# Patient Record
Sex: MALE | Race: WHITE | NOT HISPANIC OR LATINO | Employment: FULL TIME | URBAN - METROPOLITAN AREA
[De-identification: names, ages, dates, MRNs, and addresses within clinical notes are randomized per-mention and may not be internally consistent; named-entity substitution may affect disease eponyms.]

---

## 2023-05-11 ENCOUNTER — OFFICE VISIT (OUTPATIENT)
Dept: PRIMARY CARE | Facility: CLINIC | Age: 78
End: 2023-05-11
Payer: MEDICARE

## 2023-05-11 VITALS
HEART RATE: 71 BPM | BODY MASS INDEX: 26.04 KG/M2 | HEIGHT: 71 IN | OXYGEN SATURATION: 98 % | SYSTOLIC BLOOD PRESSURE: 144 MMHG | TEMPERATURE: 97.7 F | WEIGHT: 186 LBS | DIASTOLIC BLOOD PRESSURE: 84 MMHG | RESPIRATION RATE: 14 BRPM

## 2023-05-11 DIAGNOSIS — Z86.79 HISTORY OF VARICOSE VEINS: ICD-10-CM

## 2023-05-11 DIAGNOSIS — I70.213 ATHEROSCLER OF NATIVE ARTERY OF BOTH LEGS WITH INTERMIT CLAUDICATION (CMS-HCC): ICD-10-CM

## 2023-05-11 DIAGNOSIS — I10 PRIMARY HYPERTENSION: Primary | ICD-10-CM

## 2023-05-11 DIAGNOSIS — D69.6 THROMBOCYTOPENIA (CMS-HCC): ICD-10-CM

## 2023-05-11 PROBLEM — G47.33 OBSTRUCTIVE SLEEP APNEA: Status: ACTIVE | Noted: 2023-05-11

## 2023-05-11 PROBLEM — G47.30 SLEEP DISORDER BREATHING: Status: ACTIVE | Noted: 2023-05-11

## 2023-05-11 PROBLEM — I87.2 VENOUS INSUFFICIENCY: Status: ACTIVE | Noted: 2023-05-11

## 2023-05-11 PROBLEM — R94.31 ABNORMAL ECG: Status: ACTIVE | Noted: 2023-05-11

## 2023-05-11 PROBLEM — J32.9 SINUSITIS: Status: ACTIVE | Noted: 2023-05-11

## 2023-05-11 PROBLEM — R60.0 BILATERAL LOWER EXTREMITY EDEMA: Status: ACTIVE | Noted: 2023-05-11

## 2023-05-11 PROBLEM — M54.2 NECK PAIN: Status: ACTIVE | Noted: 2023-05-11

## 2023-05-11 PROBLEM — M79.642 PAIN OF LEFT HAND: Status: ACTIVE | Noted: 2023-05-11

## 2023-05-11 PROBLEM — N63.10 MASS OF BREAST, RIGHT: Status: ACTIVE | Noted: 2023-05-11

## 2023-05-11 PROCEDURE — 1157F ADVNC CARE PLAN IN RCRD: CPT | Performed by: INTERNAL MEDICINE

## 2023-05-11 PROCEDURE — 1036F TOBACCO NON-USER: CPT | Performed by: INTERNAL MEDICINE

## 2023-05-11 PROCEDURE — 1159F MED LIST DOCD IN RCRD: CPT | Performed by: INTERNAL MEDICINE

## 2023-05-11 PROCEDURE — 3077F SYST BP >= 140 MM HG: CPT | Performed by: INTERNAL MEDICINE

## 2023-05-11 PROCEDURE — 1160F RVW MEDS BY RX/DR IN RCRD: CPT | Performed by: INTERNAL MEDICINE

## 2023-05-11 PROCEDURE — 99213 OFFICE O/P EST LOW 20 MIN: CPT | Performed by: INTERNAL MEDICINE

## 2023-05-11 PROCEDURE — 3079F DIAST BP 80-89 MM HG: CPT | Performed by: INTERNAL MEDICINE

## 2023-05-11 RX ORDER — SPIRONOLACTONE 25 MG/1
TABLET ORAL
COMMUNITY
End: 2023-05-11 | Stop reason: ALTCHOICE

## 2023-05-11 RX ORDER — TRAZODONE HYDROCHLORIDE 50 MG/1
1 TABLET ORAL NIGHTLY PRN
COMMUNITY
Start: 2022-11-16 | End: 2023-12-19 | Stop reason: ENTERED-IN-ERROR

## 2023-05-11 RX ORDER — AMLODIPINE BESYLATE 5 MG/1
2.5 TABLET ORAL DAILY
COMMUNITY
End: 2023-05-11 | Stop reason: SDUPTHER

## 2023-05-11 RX ORDER — LOSARTAN POTASSIUM AND HYDROCHLOROTHIAZIDE 12.5; 5 MG/1; MG/1
1 TABLET ORAL DAILY
Qty: 30 TABLET | Refills: 11 | Status: SHIPPED | OUTPATIENT
Start: 2023-05-11 | End: 2023-06-06

## 2023-05-11 RX ORDER — FLUTICASONE PROPIONATE 50 MCG
SPRAY, SUSPENSION (ML) NASAL
COMMUNITY
Start: 2023-01-17 | End: 2023-05-11 | Stop reason: ALTCHOICE

## 2023-05-11 RX ORDER — AMLODIPINE BESYLATE 2.5 MG/1
2.5 TABLET ORAL DAILY
Qty: 90 TABLET | Refills: 3 | Status: SHIPPED | OUTPATIENT
Start: 2023-05-11 | End: 2023-08-16 | Stop reason: SINTOL

## 2023-05-11 RX ORDER — OMEPRAZOLE 10 MG/1
10 CAPSULE, DELAYED RELEASE ORAL DAILY
COMMUNITY

## 2023-05-11 RX ORDER — FUROSEMIDE 20 MG/1
1 TABLET ORAL DAILY PRN
COMMUNITY
Start: 2023-03-13 | End: 2023-05-11 | Stop reason: ALTCHOICE

## 2023-05-11 RX ORDER — CARVEDILOL 12.5 MG/1
TABLET ORAL
COMMUNITY
End: 2024-04-19

## 2023-05-11 RX ORDER — HYDROCHLOROTHIAZIDE 12.5 MG/1
12.5 TABLET ORAL
COMMUNITY
End: 2023-05-11

## 2023-05-11 ASSESSMENT — ENCOUNTER SYMPTOMS
SHORTNESS OF BREATH: 0
WHEEZING: 0
CONSTIPATION: 0
PALPITATIONS: 0
HYPERTENSION: 1
ABDOMINAL PAIN: 0
COUGH: 0
DIARRHEA: 1

## 2023-05-11 NOTE — PROGRESS NOTES
"Subjective   Patient ID: Mateo Berumen is a 77 y.o. male who presents for Hypertension (Follow up htn.).    Hypertension  Pertinent negatives include no chest pain, palpitations or shortness of breath.        Review of Systems   Respiratory:  Negative for cough, shortness of breath and wheezing.    Cardiovascular:  Negative for chest pain and palpitations.   Gastrointestinal:  Positive for diarrhea. Negative for abdominal pain and constipation.       Objective   /84 (BP Location: Right arm, Patient Position: Sitting, BP Cuff Size: Adult)   Pulse 71   Temp 36.5 °C (97.7 °F) (Tympanic)   Resp 14   Ht 1.803 m (5' 11\")   Wt 84.4 kg (186 lb)   SpO2 98%   BMI 25.94 kg/m²     Physical Exam  Vitals reviewed.   Constitutional:       Appearance: Normal appearance.   HENT:      Head: Normocephalic.   Cardiovascular:      Rate and Rhythm: Normal rate.   Pulmonary:      Effort: Pulmonary effort is normal.   Musculoskeletal:         General: Normal range of motion.   Neurological:      General: No focal deficit present.      Mental Status: He is alert.   Psychiatric:         Mood and Affect: Mood normal.         Assessment/Plan   Problem List Items Addressed This Visit          Circulatory    Atheroscler of native artery of both legs with intermit claudication (CMS/HCC)    Hypertension - Primary    Relevant Medications    amLODIPine (Norvasc) 2.5 mg tablet    losartan-hydrochlorothiazide (Hyzaar) 50-12.5 mg tablet    Other Relevant Orders    Basic Metabolic Panel       Hematologic    Thrombocytopenia (CMS/HCC)     Other Visit Diagnoses       History of varicose veins            Discussed patient's current blood pressure and discussed goal blood pressure of 120/80.  We discussed the benefit of low salt diet and regular physical activity of at least 150 min/week.  We discussed checking their blood pressure outside of the office 2-3 x/week.  Patient is to document their results and notify the office if blood " pressure results are regularly over 130/85.  All questions answered.

## 2023-06-06 DIAGNOSIS — I10 PRIMARY HYPERTENSION: ICD-10-CM

## 2023-06-06 RX ORDER — LOSARTAN POTASSIUM AND HYDROCHLOROTHIAZIDE 12.5; 5 MG/1; MG/1
TABLET ORAL
Qty: 30 TABLET | Refills: 11 | Status: SHIPPED | OUTPATIENT
Start: 2023-06-06 | End: 2023-08-16 | Stop reason: ALTCHOICE

## 2023-08-15 ENCOUNTER — LAB (OUTPATIENT)
Dept: LAB | Facility: LAB | Age: 78
End: 2023-08-15
Payer: MEDICARE

## 2023-08-15 DIAGNOSIS — I10 PRIMARY HYPERTENSION: ICD-10-CM

## 2023-08-15 LAB
ANION GAP IN SER/PLAS: 13 MMOL/L (ref 10–20)
CALCIUM (MG/DL) IN SER/PLAS: 10 MG/DL (ref 8.6–10.3)
CARBON DIOXIDE, TOTAL (MMOL/L) IN SER/PLAS: 28 MMOL/L (ref 21–32)
CHLORIDE (MMOL/L) IN SER/PLAS: 103 MMOL/L (ref 98–107)
CREATININE (MG/DL) IN SER/PLAS: 0.97 MG/DL (ref 0.5–1.3)
GFR MALE: 81 ML/MIN/1.73M2
GLUCOSE (MG/DL) IN SER/PLAS: 129 MG/DL (ref 74–99)
POTASSIUM (MMOL/L) IN SER/PLAS: 4.1 MMOL/L (ref 3.5–5.3)
SODIUM (MMOL/L) IN SER/PLAS: 140 MMOL/L (ref 136–145)
UREA NITROGEN (MG/DL) IN SER/PLAS: 17 MG/DL (ref 6–23)

## 2023-08-15 PROCEDURE — 80048 BASIC METABOLIC PNL TOTAL CA: CPT

## 2023-08-15 PROCEDURE — 36415 COLL VENOUS BLD VENIPUNCTURE: CPT

## 2023-08-16 ENCOUNTER — OFFICE VISIT (OUTPATIENT)
Dept: PRIMARY CARE | Facility: CLINIC | Age: 78
End: 2023-08-16
Payer: MEDICARE

## 2023-08-16 VITALS
HEART RATE: 70 BPM | OXYGEN SATURATION: 98 % | HEIGHT: 71 IN | RESPIRATION RATE: 14 BRPM | BODY MASS INDEX: 25.62 KG/M2 | SYSTOLIC BLOOD PRESSURE: 128 MMHG | WEIGHT: 183 LBS | DIASTOLIC BLOOD PRESSURE: 80 MMHG | TEMPERATURE: 97 F

## 2023-08-16 DIAGNOSIS — I10 PRIMARY HYPERTENSION: Primary | ICD-10-CM

## 2023-08-16 DIAGNOSIS — K76.0 FATTY LIVER: ICD-10-CM

## 2023-08-16 DIAGNOSIS — Z00.00 PERIODIC HEALTH ASSESSMENT, GENERAL SCREENING, ADULT: ICD-10-CM

## 2023-08-16 DIAGNOSIS — R53.83 FATIGUE, UNSPECIFIED TYPE: ICD-10-CM

## 2023-08-16 DIAGNOSIS — R60.9 EDEMA, UNSPECIFIED TYPE: ICD-10-CM

## 2023-08-16 DIAGNOSIS — R73.9 ELEVATED BLOOD SUGAR: ICD-10-CM

## 2023-08-16 LAB — POC HEMOGLOBIN A1C: 5.6 % (ref 4.2–6.5)

## 2023-08-16 PROCEDURE — 1126F AMNT PAIN NOTED NONE PRSNT: CPT | Performed by: INTERNAL MEDICINE

## 2023-08-16 PROCEDURE — 3079F DIAST BP 80-89 MM HG: CPT | Performed by: INTERNAL MEDICINE

## 2023-08-16 PROCEDURE — 1160F RVW MEDS BY RX/DR IN RCRD: CPT | Performed by: INTERNAL MEDICINE

## 2023-08-16 PROCEDURE — 99214 OFFICE O/P EST MOD 30 MIN: CPT | Performed by: INTERNAL MEDICINE

## 2023-08-16 PROCEDURE — 3074F SYST BP LT 130 MM HG: CPT | Performed by: INTERNAL MEDICINE

## 2023-08-16 PROCEDURE — 1157F ADVNC CARE PLAN IN RCRD: CPT | Performed by: INTERNAL MEDICINE

## 2023-08-16 PROCEDURE — 1036F TOBACCO NON-USER: CPT | Performed by: INTERNAL MEDICINE

## 2023-08-16 PROCEDURE — 83036 HEMOGLOBIN GLYCOSYLATED A1C: CPT | Performed by: INTERNAL MEDICINE

## 2023-08-16 PROCEDURE — 1159F MED LIST DOCD IN RCRD: CPT | Performed by: INTERNAL MEDICINE

## 2023-08-16 RX ORDER — LOSARTAN POTASSIUM AND HYDROCHLOROTHIAZIDE 12.5; 1 MG/1; MG/1
1 TABLET ORAL DAILY
Qty: 30 TABLET | Refills: 11 | Status: SHIPPED | OUTPATIENT
Start: 2023-08-16 | End: 2023-09-13

## 2023-08-16 ASSESSMENT — ENCOUNTER SYMPTOMS
HYPERTENSION: 1
DIARRHEA: 0
CONSTIPATION: 0
SHORTNESS OF BREATH: 0
WHEEZING: 0
COUGH: 0
ABDOMINAL PAIN: 0

## 2023-08-16 NOTE — PROGRESS NOTES
"Subjective   Patient ID: Mateo Berumen is a 77 y.o. male who presents for Hypertension ( And lab work results.).    Hypertension  Pertinent negatives include no chest pain or shortness of breath.    We reviewed and discussed his blood tests.  He is active and denies any issues with CP, SOB or dizzy spells.    Still with swelling in legs.  No pain.  No claudication.      Review of Systems   Respiratory:  Negative for cough, shortness of breath and wheezing.    Cardiovascular:  Negative for chest pain.   Gastrointestinal:  Negative for abdominal pain, constipation and diarrhea.       Objective   /80 (BP Location: Left arm, Patient Position: Sitting, BP Cuff Size: Adult)   Pulse 70   Temp 36.1 °C (97 °F) (Tympanic)   Resp 14   Ht 1.803 m (5' 11\")   Wt 83 kg (183 lb)   SpO2 98%   BMI 25.52 kg/m²     Physical Exam  Vitals reviewed.   Constitutional:       Appearance: Normal appearance.   HENT:      Head: Normocephalic.   Cardiovascular:      Rate and Rhythm: Normal rate.   Pulmonary:      Effort: Pulmonary effort is normal.   Musculoskeletal:         General: Normal range of motion.   Neurological:      General: No focal deficit present.      Mental Status: He is alert.   Psychiatric:         Mood and Affect: Mood normal.         Assessment/Plan   Problem List Items Addressed This Visit       Hypertension - Primary    Relevant Medications    losartan-hydrochlorothiazide (Hyzaar) 100-12.5 mg tablet     Other Visit Diagnoses       Elevated blood sugar        Relevant Orders    Hemoglobin A1C    POCT glycosylated hemoglobin (Hb A1C) manually resulted (Completed)    Fatty liver        Relevant Orders    Comprehensive Metabolic Panel    Periodic health assessment, general screening, adult        Relevant Orders    CBC    Comprehensive Metabolic Panel    Lipid Panel    Prostate Specific Antigen    Thyroid Stimulating Hormone    Hemoglobin A1C    Fatigue, unspecified type        Relevant Orders    CBC    Edema, " unspecified type            Still with pitting edema of lower extremities - equal bilateral.  Seems to have started only since being on amlodipine.  We will stop this and double his losartan.    We discussed his elevated blood sugar. A1c is ok.  We discussed the benefits of low sugar and low carbohydrate diet (avoiding sugars, sweets, desserts, pop, juice, milk and minimizing foods such as breads, pasta/noodles, rice, cereal etc)   We discussed his history of FLD.  Stressed need to keep weight down, sugar down and alcohol to minimum - no more than 2 drinks/day.    Follow up in 6 months - sooner if any issues.

## 2023-08-18 ENCOUNTER — APPOINTMENT (OUTPATIENT)
Dept: PRIMARY CARE | Facility: CLINIC | Age: 78
End: 2023-08-18
Payer: COMMERCIAL

## 2023-09-12 DIAGNOSIS — I10 PRIMARY HYPERTENSION: ICD-10-CM

## 2023-09-13 RX ORDER — LOSARTAN POTASSIUM AND HYDROCHLOROTHIAZIDE 12.5; 1 MG/1; MG/1
1 TABLET ORAL DAILY
Qty: 30 TABLET | Refills: 11 | Status: SHIPPED | OUTPATIENT
Start: 2023-09-13

## 2023-10-20 PROBLEM — Z98.49 HISTORY OF YAG LASER CAPSULOTOMY OF LENS: Status: ACTIVE | Noted: 2017-06-08

## 2023-10-20 PROBLEM — M10.00 IDIOPATHIC GOUT, UNSPECIFIED SITE: Status: ACTIVE | Noted: 2023-10-20

## 2023-10-20 PROBLEM — K76.0 FATTY LIVER: Status: ACTIVE | Noted: 2023-10-20

## 2023-10-20 PROBLEM — I70.219 EXTREMITY ATHEROSCLEROSIS WITH INTERMITTENT CLAUDICATION (CMS-HCC): Status: ACTIVE | Noted: 2023-10-20

## 2023-10-20 PROBLEM — M72.0 DUPUYTREN'S CONTRACTURE OF LEFT HAND: Status: ACTIVE | Noted: 2018-06-21

## 2023-10-20 PROBLEM — K76.9 LIVER LESION: Status: ACTIVE | Noted: 2023-10-20

## 2023-10-20 PROBLEM — G47.20 DISTURBED SLEEP RHYTHM: Status: ACTIVE | Noted: 2023-10-20

## 2023-10-20 PROBLEM — E78.5 DYSLIPIDEMIA: Status: ACTIVE | Noted: 2023-10-20

## 2023-10-20 RX ORDER — AMLODIPINE BESYLATE 5 MG/1
5 TABLET ORAL DAILY
COMMUNITY
End: 2023-10-31 | Stop reason: WASHOUT

## 2023-10-20 RX ORDER — FINASTERIDE 5 MG/1
5 TABLET, FILM COATED ORAL DAILY
COMMUNITY
Start: 2023-09-07 | End: 2024-04-29

## 2023-10-20 RX ORDER — FUROSEMIDE 20 MG/1
20 TABLET ORAL DAILY PRN
COMMUNITY
End: 2024-05-07 | Stop reason: WASHOUT

## 2023-10-20 RX ORDER — ATORVASTATIN CALCIUM 40 MG/1
40 TABLET, FILM COATED ORAL DAILY
COMMUNITY

## 2023-10-20 RX ORDER — ALLOPURINOL 300 MG/1
300 TABLET ORAL DAILY
COMMUNITY
End: 2024-01-23

## 2023-10-23 ENCOUNTER — APPOINTMENT (OUTPATIENT)
Dept: CARDIOLOGY | Facility: CLINIC | Age: 78
End: 2023-10-23
Payer: MEDICARE

## 2023-10-23 NOTE — PROGRESS NOTES
Chief Complaint:   No chief complaint on file.     History Of Present Illness:    Mateo Berumen is a 77 y.o. male with a history of AAA, hypertension, dyslipidemia, abnormal ECG (low voltage and inferior Q waves with poor R wave progression in the precordium), palpitations and atypical chest pain being evaluated for routine follow-up.      Blood pressure was mildly elevated at his primary care physician's office and his amlodipine was increased to 10 mg daily. Shortly thereafter he started noticing leg swelling. He was given a prescription of furosemide to use as needed. This did help and his legs are more similar to how they had been in the past but still swell by the end of the day.     Had a sleep study which demonstrated moderate sleep apnea. Has not had follow-up since that time.     Abdominal aortic duplex 9/15/23: No change in mid AAA 3.57 x 3.48 cm    Echocardiogram 3/29/23:  EF 60-65%. Mild MR and trace TR. Trivial pericardial effusion.     Sleep study March 2023 demonstrated moderate sleep apnea.     Abdominal aortic ultrasound September 2022: Aorta measuring 3.57 cm.     CT of the abdomen April 2021 demonstrating saccular aneurysm of the infrarenal aorta measuring 3.5 cm.     Treadmill nuclear stress test 8/24/18 demonstrating no ECG evidence of ischemia. No nuclear evidence of ischemia or scar. EF 73%. Low likelihood of flow-limiting coronary artery disease.       Past Medical History:  He has a past medical history of Abdominal aortic aneurysm without rupture (CMS/HCC) (09/29/2022), Essential (primary) hypertension (11/16/2022), Personal history of other diseases of male genital organs (05/12/2020), Personal history of other diseases of the musculoskeletal system and connective tissue, Personal history of other diseases of the nervous system and sense organs, Personal history of other diseases of the nervous system and sense organs, Personal history of other endocrine, nutritional and metabolic  disease, and Venous insufficiency (chronic) (peripheral).    Past Surgical History:  He has a past surgical history that includes Other surgical history (02/03/2016).      Social History:  He reports that he has never smoked. He has never used smokeless tobacco. He reports that he does not currently use alcohol after a past usage of about 14.0 standard drinks of alcohol per week. He reports current drug use. Drug: Marijuana.    Family History:  Family History   Problem Relation Name Age of Onset    Cancer Mother      Cancer Father      Heart attack Father      No Known Problems Sister      No Known Problems Brother          Allergies:  Patient has no known allergies.    Outpatient Medications:  Current Outpatient Medications   Medication Instructions    allopurinol (ZYLOPRIM) 300 mg, oral, Daily    amLODIPine (NORVASC) 5 mg, oral, Daily    atorvastatin (LIPITOR) 40 mg, oral, Daily    carvedilol (Coreg) 12.5 mg tablet oral, 2 times daily with meals    finasteride (PROSCAR) 5 mg, oral, Daily    furosemide (LASIX) 20 mg, oral, Daily PRN    losartan-hydrochlorothiazide (Hyzaar) 100-12.5 mg tablet 1 tablet, oral, Daily    OMEPRAZOLE ORAL oral, Daily RT    traZODone (Desyrel) 50 mg tablet 1 tablet, oral, Nightly PRN       Last Recorded Vitals:  Visit Vitals  Smoking Status Never        Physical Exam:  Constitutional: alert and in no acute distress.   HEENT: Mucous membranes moist, carotid upstrokes normal with no bruits. JVP is normal. No cervical lymphadenopathy. Cranial nerves II-XII grossly intact.  Pulmonary: Clear to auscultation bilaterally.  Cardiovascular: S1,S2, regular. No appreciable murmurs, rubs or gallops.   Lower extremities: Warm. 1+ distal pulses. Trivial bipedal edema.  Skin: warm and dry. No obvious rashes visualized.  Psychiatric: Oriented to person, place and time. No obvious agitation.      Last Labs:  CBC -  Lab Results   Component Value Date    WBC 7.9 01/11/2023    HGB 15.3 01/11/2023    HCT 44.6  01/11/2023     01/11/2023     (L) 01/11/2023       CMP -  Lab Results   Component Value Date    CALCIUM 10.0 08/15/2023    PROT 7.2 01/11/2023    ALBUMIN 4.0 01/11/2023    AST 26 01/11/2023    ALT 33 01/11/2023    ALKPHOS 63 01/11/2023    BILITOT 0.7 01/11/2023       LIPID PANEL -   Lab Results   Component Value Date    CHOL 152 01/11/2023    TRIG 129 01/11/2023    HDL 69.6 01/11/2023    CHHDL 2.2 01/11/2023    LDLF 57 01/11/2023    VLDL 26 01/11/2023    NHDL 70 11/12/2020       RENAL FUNCTION PANEL -   Lab Results   Component Value Date    GLUCOSE 129 (H) 08/15/2023     08/15/2023    K 4.1 08/15/2023     08/15/2023    CO2 28 08/15/2023    ANIONGAP 13 08/15/2023    BUN 17 08/15/2023    CREATININE 0.97 08/15/2023    GFRMALE 81 08/15/2023    CALCIUM 10.0 08/15/2023    ALBUMIN 4.0 01/11/2023        Lab Results   Component Value Date    HGBA1C 5.6 08/16/2023           Assessment/Plan   1) dyslipidemia: Continue current regimen. LDL < 70 on last check.     2) hypertension: Although his blood pressures were higher at his primary care physician's office and the amlodipine was increased he did notice leg swelling ever since then. I asked him to decrease the amlodipine to 5 mg daily and call us in a week or 2 with readings. If blood pressure is elevated we will increase his carvedilol.     3) abdominal aortic aneurysm: Following with vascular surgery. Last ultrasound shows diameter measures 3.6 cm compared to 3.7cm 1 year earlier. Continue medical therapy.      4) abnormal ECG: Unchanged.     5) sleep apnea: Continue with sleep medicine     6) lower extremity edema: Likely secondary to increased amlodipine. Echocardiogram pending to rule out structural heart disease. Can use furosemide as needed.     7) follow-up: 6 months or sooner if needed.       Cruzito Mcneil MD

## 2023-10-27 ENCOUNTER — CLINICAL SUPPORT (OUTPATIENT)
Dept: PRIMARY CARE | Facility: CLINIC | Age: 78
End: 2023-10-27
Payer: MEDICARE

## 2023-10-27 PROCEDURE — G0008 ADMIN INFLUENZA VIRUS VAC: HCPCS | Performed by: INTERNAL MEDICINE

## 2023-10-27 PROCEDURE — 90662 IIV NO PRSV INCREASED AG IM: CPT | Performed by: INTERNAL MEDICINE

## 2023-10-31 ENCOUNTER — OFFICE VISIT (OUTPATIENT)
Dept: CARDIOLOGY | Facility: CLINIC | Age: 78
End: 2023-10-31
Payer: MEDICARE

## 2023-10-31 VITALS
HEART RATE: 75 BPM | WEIGHT: 182 LBS | DIASTOLIC BLOOD PRESSURE: 80 MMHG | BODY MASS INDEX: 25.48 KG/M2 | HEIGHT: 71 IN | SYSTOLIC BLOOD PRESSURE: 120 MMHG | OXYGEN SATURATION: 98 %

## 2023-10-31 DIAGNOSIS — I71.40 ABDOMINAL AORTIC ANEURYSM (AAA) WITHOUT RUPTURE, UNSPECIFIED PART (CMS-HCC): ICD-10-CM

## 2023-10-31 DIAGNOSIS — I10 PRIMARY HYPERTENSION: Primary | ICD-10-CM

## 2023-10-31 DIAGNOSIS — E78.5 DYSLIPIDEMIA: ICD-10-CM

## 2023-10-31 DIAGNOSIS — I87.2 VENOUS INSUFFICIENCY: ICD-10-CM

## 2023-10-31 PROCEDURE — 1036F TOBACCO NON-USER: CPT | Performed by: INTERNAL MEDICINE

## 2023-10-31 PROCEDURE — 99214 OFFICE O/P EST MOD 30 MIN: CPT | Performed by: INTERNAL MEDICINE

## 2023-10-31 PROCEDURE — 1159F MED LIST DOCD IN RCRD: CPT | Performed by: INTERNAL MEDICINE

## 2023-10-31 PROCEDURE — 93000 ELECTROCARDIOGRAM COMPLETE: CPT | Performed by: INTERNAL MEDICINE

## 2023-10-31 PROCEDURE — 1126F AMNT PAIN NOTED NONE PRSNT: CPT | Performed by: INTERNAL MEDICINE

## 2023-10-31 PROCEDURE — 3079F DIAST BP 80-89 MM HG: CPT | Performed by: INTERNAL MEDICINE

## 2023-10-31 PROCEDURE — 3074F SYST BP LT 130 MM HG: CPT | Performed by: INTERNAL MEDICINE

## 2023-10-31 PROCEDURE — 1160F RVW MEDS BY RX/DR IN RCRD: CPT | Performed by: INTERNAL MEDICINE

## 2023-10-31 NOTE — PROGRESS NOTES
Chief Complaint:   No chief complaint on file.     History Of Present Illness:    Mateo Berumen is a 77 y.o. male with a history of AAA, hypertension, dyslipidemia, abnormal ECG (anterior and inferior Q waves with low voltage in the precordial leads), palpitations and atypical chest pain being evaluated for routine follow-up.      Checks his blood pressure at home and has been getting occasional elevated readings.  Routinely checks it at the drugstore near work and it is always normal there.  Had been on amlodipine which was decreased initially from 10 mg to 5 mg because of leg swelling.  Amlodipine was eventually discontinued altogether.    Denies any exertional chest pain or shortness of breath.  Denies any significant leg swelling.     Had a sleep study which demonstrated mild to moderate sleep apnea.  He spoke to the specialist and they have decided to hold off on CPAP for now.     Abdominal aortic duplex 9/15/23: No change in mid AAA 3.57 x 3.48 cm    Echocardiogram 3/29/23:  EF 60-65%. Mild MR and trace TR. Trivial pericardial effusion.     Sleep study March 2023 demonstrated moderate sleep apnea.     Abdominal aortic ultrasound September 2022: Aorta measuring 3.57 cm.     CT of the abdomen April 2021 demonstrating saccular aneurysm of the infrarenal aorta measuring 3.5 cm.     Treadmill nuclear stress test 8/24/18 demonstrating no ECG evidence of ischemia. No nuclear evidence of ischemia or scar. EF 73%. Low likelihood of flow-limiting coronary artery disease.     Past Medical History:  He has a past medical history of Abdominal aortic aneurysm without rupture (CMS/HCC) (09/29/2022), Essential (primary) hypertension (11/16/2022), Personal history of other diseases of male genital organs (05/12/2020), Personal history of other diseases of the musculoskeletal system and connective tissue, Personal history of other diseases of the nervous system and sense organs, Personal history of other diseases of the  "nervous system and sense organs, Personal history of other endocrine, nutritional and metabolic disease, and Venous insufficiency (chronic) (peripheral).    Past Surgical History:  He has a past surgical history that includes Other surgical history (02/03/2016).      Social History:  He reports that he has never smoked. He has never used smokeless tobacco. He reports that he does not currently use alcohol after a past usage of about 14.0 standard drinks of alcohol per week. He reports current drug use. Drug: Marijuana.    Family History:  Family History   Problem Relation Name Age of Onset    Cancer Mother      Cancer Father      Heart attack Father      No Known Problems Sister      No Known Problems Brother          Allergies:  Patient has no known allergies.    Outpatient Medications:  Current Outpatient Medications   Medication Instructions    allopurinol (ZYLOPRIM) 300 mg, oral, Daily    atorvastatin (LIPITOR) 40 mg, oral, Daily    carvedilol (Coreg) 12.5 mg tablet oral, 2 times daily with meals    finasteride (PROSCAR) 5 mg, oral, Daily    furosemide (LASIX) 20 mg, oral, Daily PRN    losartan-hydrochlorothiazide (Hyzaar) 100-12.5 mg tablet 1 tablet, oral, Daily    OMEPRAZOLE ORAL oral, Daily RT    traZODone (Desyrel) 50 mg tablet 1 tablet, oral, Nightly PRN       Last Recorded Vitals:  Visit Vitals  /80 (BP Location: Left arm, Patient Position: Sitting)   Pulse 75   Ht 1.803 m (5' 11\")   Wt 82.6 kg (182 lb)   SpO2 98%   BMI 25.38 kg/m²   Smoking Status Never   BSA 2.03 m²      LASTWT(3):   Wt Readings from Last 3 Encounters:   10/31/23 82.6 kg (182 lb)   08/16/23 83 kg (183 lb)   05/11/23 84.4 kg (186 lb)       Physical Exam:  Constitutional: alert and in no acute distress.   HEENT: Mucous membranes moist, carotid upstrokes normal with no bruits. JVP is normal. No cervical lymphadenopathy. Cranial nerves II-XII grossly intact.  Pulmonary: Clear to auscultation bilaterally.  Cardiovascular: S1,S2, " regular. No appreciable murmurs, rubs or gallops.   Lower extremities: Warm. 1+ distal pulses.  No edema.  Skin: warm and dry. No obvious rashes visualized.  Psychiatric: Oriented to person, place and time. No obvious agitation.     Last Labs:  CBC -  Recent Labs     01/11/23  1329   WBC 7.9   HGB 15.3   HCT 44.6   *          CMP -  Recent Labs     08/15/23  1136 01/11/23  1329 11/12/20  0950    141 142   K 4.1 3.9 4.1    103 106   CO2 28 30 28   ANIONGAP 13 12 12   BUN 17 14 14   CREATININE 0.97 0.99 1.01     Recent Labs     01/11/23  1329 05/29/21  0825 11/12/20  0950   ALBUMIN 4.0 4.1 3.9   ALKPHOS 63 41 46   ALT 33 23 25   AST 26 20 25   BILITOT 0.7 0.6 0.6       LIPID PANEL -   Recent Labs     01/11/23  1329 07/01/21  1413 11/12/20  0950   CHOL 152 142 120   LDLF 57 52 20   HDL 69.6 63.0 50.0   TRIG 129 135 252*       Recent Labs     08/16/23  0926   HGBA1C 5.6           Assessment/Plan   1) dyslipidemia: Continue current regimen. LDL < 70 on last check.     2) hypertension: Blood pressure normal here in the office.  He is can to bring his blood pressure machine into our office to make sure that it correlates.  No further recommendations at this time.     3) abdominal aortic aneurysm: Following with vascular surgery. Last ultrasound shows no change. Continue medical therapy.      4) abnormal ECG: Unchanged.     5) sleep apnea: We talked about whether or not to try CPAP.  He is not having any significant daytime somnolence and as long as his blood pressure is controlled I do not see the need to treat at this time.     6) lower extremity edema: Resolved     7) follow-up: 6 months or sooner if needed.      Cruzito Mcneil MD

## 2023-12-19 ENCOUNTER — APPOINTMENT (OUTPATIENT)
Dept: CARDIOLOGY | Facility: HOSPITAL | Age: 78
End: 2023-12-19
Payer: MEDICARE

## 2023-12-19 ENCOUNTER — APPOINTMENT (OUTPATIENT)
Dept: RADIOLOGY | Facility: HOSPITAL | Age: 78
End: 2023-12-19
Payer: MEDICARE

## 2023-12-19 ENCOUNTER — APPOINTMENT (OUTPATIENT)
Dept: CARDIOLOGY | Facility: CLINIC | Age: 78
End: 2023-12-19
Payer: MEDICARE

## 2023-12-19 ENCOUNTER — HOSPITAL ENCOUNTER (EMERGENCY)
Facility: HOSPITAL | Age: 78
Discharge: HOME | End: 2023-12-19
Attending: EMERGENCY MEDICINE
Payer: MEDICARE

## 2023-12-19 VITALS
HEIGHT: 72 IN | RESPIRATION RATE: 18 BRPM | BODY MASS INDEX: 26.43 KG/M2 | DIASTOLIC BLOOD PRESSURE: 93 MMHG | WEIGHT: 195.11 LBS | OXYGEN SATURATION: 97 % | TEMPERATURE: 97.7 F | SYSTOLIC BLOOD PRESSURE: 191 MMHG | HEART RATE: 50 BPM

## 2023-12-19 DIAGNOSIS — K29.70 GASTRITIS, PRESENCE OF BLEEDING UNSPECIFIED, UNSPECIFIED CHRONICITY, UNSPECIFIED GASTRITIS TYPE: ICD-10-CM

## 2023-12-19 DIAGNOSIS — R10.84 GENERALIZED ABDOMINAL PAIN: Primary | ICD-10-CM

## 2023-12-19 LAB
ALBUMIN SERPL BCP-MCNC: 4.1 G/DL (ref 3.4–5)
ALP SERPL-CCNC: 58 U/L (ref 33–136)
ALT SERPL W P-5'-P-CCNC: 19 U/L (ref 10–52)
ANION GAP SERPL CALC-SCNC: 10 MMOL/L (ref 10–20)
AST SERPL W P-5'-P-CCNC: 19 U/L (ref 9–39)
BASOPHILS # BLD AUTO: 0.03 X10*3/UL (ref 0–0.1)
BASOPHILS NFR BLD AUTO: 0.3 %
BILIRUB SERPL-MCNC: 0.9 MG/DL (ref 0–1.2)
BUN SERPL-MCNC: 20 MG/DL (ref 6–23)
CALCIUM SERPL-MCNC: 10 MG/DL (ref 8.6–10.3)
CARDIAC TROPONIN I PNL SERPL HS: 10 NG/L (ref 0–20)
CARDIAC TROPONIN I PNL SERPL HS: 5 NG/L (ref 0–20)
CHLORIDE SERPL-SCNC: 104 MMOL/L (ref 98–107)
CO2 SERPL-SCNC: 29 MMOL/L (ref 21–32)
CREAT SERPL-MCNC: 0.98 MG/DL (ref 0.5–1.3)
EOSINOPHIL # BLD AUTO: 0.17 X10*3/UL (ref 0–0.4)
EOSINOPHIL NFR BLD AUTO: 1.9 %
ERYTHROCYTE [DISTWIDTH] IN BLOOD BY AUTOMATED COUNT: 14.5 % (ref 11.5–14.5)
GFR SERPL CREATININE-BSD FRML MDRD: 80 ML/MIN/1.73M*2
GLUCOSE SERPL-MCNC: 114 MG/DL (ref 74–99)
HCT VFR BLD AUTO: 43.1 % (ref 41–52)
HGB BLD-MCNC: 14.4 G/DL (ref 13.5–17.5)
HOLD SPECIMEN: NORMAL
IMM GRANULOCYTES # BLD AUTO: 0.04 X10*3/UL (ref 0–0.5)
IMM GRANULOCYTES NFR BLD AUTO: 0.4 % (ref 0–0.9)
LYMPHOCYTES # BLD AUTO: 1.95 X10*3/UL (ref 0.8–3)
LYMPHOCYTES NFR BLD AUTO: 21.7 %
MAGNESIUM SERPL-MCNC: 1.36 MG/DL (ref 1.6–2.4)
MCH RBC QN AUTO: 34 PG (ref 26–34)
MCHC RBC AUTO-ENTMCNC: 33.4 G/DL (ref 32–36)
MCV RBC AUTO: 102 FL (ref 80–100)
MONOCYTES # BLD AUTO: 0.7 X10*3/UL (ref 0.05–0.8)
MONOCYTES NFR BLD AUTO: 7.8 %
NEUTROPHILS # BLD AUTO: 6.08 X10*3/UL (ref 1.6–5.5)
NEUTROPHILS NFR BLD AUTO: 67.9 %
NRBC BLD-RTO: 0 /100 WBCS (ref 0–0)
PLATELET # BLD AUTO: 148 X10*3/UL (ref 150–450)
POTASSIUM SERPL-SCNC: 4.1 MMOL/L (ref 3.5–5.3)
PROT SERPL-MCNC: 7 G/DL (ref 6.4–8.2)
RBC # BLD AUTO: 4.23 X10*6/UL (ref 4.5–5.9)
SODIUM SERPL-SCNC: 139 MMOL/L (ref 136–145)
WBC # BLD AUTO: 9 X10*3/UL (ref 4.4–11.3)

## 2023-12-19 PROCEDURE — 84484 ASSAY OF TROPONIN QUANT: CPT

## 2023-12-19 PROCEDURE — 2500000004 HC RX 250 GENERAL PHARMACY W/ HCPCS (ALT 636 FOR OP/ED)

## 2023-12-19 PROCEDURE — 71045 X-RAY EXAM CHEST 1 VIEW: CPT

## 2023-12-19 PROCEDURE — 74175 CTA ABDOMEN W/CONTRAST: CPT

## 2023-12-19 PROCEDURE — 71045 X-RAY EXAM CHEST 1 VIEW: CPT | Performed by: RADIOLOGY

## 2023-12-19 PROCEDURE — 85025 COMPLETE CBC W/AUTO DIFF WBC: CPT

## 2023-12-19 PROCEDURE — 36415 COLL VENOUS BLD VENIPUNCTURE: CPT

## 2023-12-19 PROCEDURE — 99285 EMERGENCY DEPT VISIT HI MDM: CPT | Mod: 25

## 2023-12-19 PROCEDURE — 83735 ASSAY OF MAGNESIUM: CPT

## 2023-12-19 PROCEDURE — 93010 ELECTROCARDIOGRAM REPORT: CPT | Performed by: EMERGENCY MEDICINE

## 2023-12-19 PROCEDURE — 93005 ELECTROCARDIOGRAM TRACING: CPT

## 2023-12-19 PROCEDURE — 80053 COMPREHEN METABOLIC PANEL: CPT

## 2023-12-19 PROCEDURE — 99284 EMERGENCY DEPT VISIT MOD MDM: CPT | Performed by: EMERGENCY MEDICINE

## 2023-12-19 PROCEDURE — 99285 EMERGENCY DEPT VISIT HI MDM: CPT | Performed by: EMERGENCY MEDICINE

## 2023-12-19 PROCEDURE — 93005 ELECTROCARDIOGRAM TRACING: CPT | Mod: MUE

## 2023-12-19 PROCEDURE — 2550000001 HC RX 255 CONTRASTS: Performed by: EMERGENCY MEDICINE

## 2023-12-19 RX ORDER — LANOLIN ALCOHOL/MO/W.PET/CERES
400 CREAM (GRAM) TOPICAL ONCE
Status: COMPLETED | OUTPATIENT
Start: 2023-12-19 | End: 2023-12-19

## 2023-12-19 RX ORDER — PANTOPRAZOLE SODIUM 20 MG/1
40 TABLET, DELAYED RELEASE ORAL 2 TIMES DAILY
Qty: 56 TABLET | Refills: 0 | Status: SHIPPED | OUTPATIENT
Start: 2023-12-19 | End: 2024-05-13 | Stop reason: ALTCHOICE

## 2023-12-19 RX ADMIN — Medication 400 MG: at 16:04

## 2023-12-19 RX ADMIN — IOHEXOL 75 ML: 350 INJECTION, SOLUTION INTRAVENOUS at 17:29

## 2023-12-19 ASSESSMENT — LIFESTYLE VARIABLES
HAVE YOU EVER FELT YOU SHOULD CUT DOWN ON YOUR DRINKING: NO
EVER FELT BAD OR GUILTY ABOUT YOUR DRINKING: NO
EVER HAD A DRINK FIRST THING IN THE MORNING TO STEADY YOUR NERVES TO GET RID OF A HANGOVER: NO
REASON UNABLE TO ASSESS: NO
HAVE PEOPLE ANNOYED YOU BY CRITICIZING YOUR DRINKING: NO

## 2023-12-19 ASSESSMENT — HEART SCORE
AGE: 65+
ECG: NORMAL
HISTORY: SLIGHTLY SUSPICIOUS
TROPONIN: LESS THAN OR EQUAL TO NORMAL LIMIT
RISK FACTORS: 1-2 RISK FACTORS
HEART SCORE: 3

## 2023-12-19 ASSESSMENT — PAIN DESCRIPTION - LOCATION: LOCATION: ABDOMEN

## 2023-12-19 ASSESSMENT — COLUMBIA-SUICIDE SEVERITY RATING SCALE - C-SSRS
1. IN THE PAST MONTH, HAVE YOU WISHED YOU WERE DEAD OR WISHED YOU COULD GO TO SLEEP AND NOT WAKE UP?: NO
2. HAVE YOU ACTUALLY HAD ANY THOUGHTS OF KILLING YOURSELF?: NO
6. HAVE YOU EVER DONE ANYTHING, STARTED TO DO ANYTHING, OR PREPARED TO DO ANYTHING TO END YOUR LIFE?: NO

## 2023-12-19 ASSESSMENT — PAIN - FUNCTIONAL ASSESSMENT
PAIN_FUNCTIONAL_ASSESSMENT: 0-10
PAIN_FUNCTIONAL_ASSESSMENT: 0-10

## 2023-12-19 ASSESSMENT — PAIN SCALES - GENERAL
PAINLEVEL_OUTOF10: 6
PAINLEVEL_OUTOF10: 0 - NO PAIN

## 2023-12-19 NOTE — ED PROVIDER NOTES
HPI   Chief Complaint   Patient presents with    Abdominal Pain    Hypertension    Bradycardia       Mateo Berumen is a 78 y.o. male presenting with a chief complaint of abdominal pain with sudden onset today.  Patient states that he was trying AKs on Zoom and had sudden onset of epigastric abdominal pain.  He states he has had a gastric ulcer in the past, states that this does not feel similar to this.  Pain is better now compared to when it started.  Denies any chest pain or shortness of breath.  Has not had symptoms like this previously.  Of note, patient does think that he may have taken an extra dose of his carvedilol this morning accidentally.                            [place] Coma Scale Score: 15   HEART Score: 3                Patient History   Past Medical History:   Diagnosis Date    Abdominal aortic aneurysm without rupture (CMS/HCC) 09/29/2022    Abdominal aortic aneurysm, without rupture    Essential (primary) hypertension 11/16/2022    Hypertension    Personal history of other diseases of male genital organs 05/12/2020    History of benign prostatic hyperplasia    Personal history of other diseases of the musculoskeletal system and connective tissue     History of gout    Personal history of other diseases of the nervous system and sense organs     History of neuropathy    Personal history of other diseases of the nervous system and sense organs     History of carpal tunnel syndrome    Personal history of other endocrine, nutritional and metabolic disease     History of hypercholesterolemia    Venous insufficiency (chronic) (peripheral)     Venous reflux     Past Surgical History:   Procedure Laterality Date    CT ABDOMEN ANGIOGRAM W AND/OR WO IV CONTRAST  12/19/2023    CT ABDOMEN ANGIOGRAM W AND/OR WO IV CONTRAST 12/19/2023 [facility]    OTHER SURGICAL HISTORY  02/03/2016    Bladder Cystotomy With Basket Extraction Of Calculus     Family History   Problem Relation Name Age of Onset    Cancer Mother       Cancer Father      Heart attack Father      No Known Problems Sister      No Known Problems Brother       Social History     Tobacco Use    Smoking status: Never    Smokeless tobacco: Never   Vaping Use    Vaping Use: Never used   Substance Use Topics    Alcohol use: Not Currently     Alcohol/week: 14.0 standard drinks of alcohol     Types: 7 Glasses of wine, 7 Standard drinks or equivalent per week    Drug use: Yes     Types: Marijuana       Physical Exam   ED Triage Vitals   Temp Pulse Resp BP   -- -- -- --      SpO2 Temp src Heart Rate Source Patient Position   -- -- -- --      BP Location FiO2 (%)     -- --       Physical Exam    ED Course & MDM   ED Course as of 12/19/23 1941 Tue Dec 19, 2023   1600 XR chest 1 view  No acute cardiopulmonary process. [CL]   1600 Troponin I Series, High Sensitivity (0, 1 HR)  Initial troponin negative. [CL]   1600 Comprehensive Metabolic Panel(!)  Overall unremarkable [CL]   1600 Magnesium(!)  Decrease, this was replaced with oral magnesium supplements [CL]   1602 CBC and Auto Differential(!)  Overall unremarkable [CL]   1602 EKG demonstrates sinus bradycardia with a rate of 42, TN interval of 202, QTc 384 no acute ischemic changes. [CL]   1706 Troponin I Series, High Sensitivity (0, 1 HR)  Troponin x 2 negative [CL]   1929 EKG was without ischemic changes.  Lab work was unremarkable with normal/adequately downtrending troponin.  Chest x-ray without evidence of pneumothorax, pneumonia, pneumomediastinum, or other abnormalities that would require admission.  Very low concern for ACS at this time as a cause of the patient's symptoms.  Admission was considered, but not indicated given a heart score less than 4 and low clinical suspicion for ACS.    CT scan without evidence of worsening of his AAA.  Overall no other concerning findings on CT scan.  Results were discussed at length with the patient.  He is advised on the importance of following up with a cardiologist and his  primary care provider for ER follow-up.  Patient was given a prescription for Protonix 40 mg to be taken twice a day for the next 2 weeks for treatment of potential gastritis as a cause of his symptoms.  Otherwise patient is a low heart score and does not warrant admission at this time. [CL]   1940 Repeat abdominal exam was benign.  Patient tolerated ambulation. [CL]   1940 Patient was in agreement with plan for discharge home and close follow-up with cardiology and his primary care provider as outpatient.  He was given a prescription Protonix twice daily to treat gastritis as a potential cause of his symptoms. [CL]      ED Course User Index  [CL] Rogre Leung DO         Diagnoses as of 12/19/23 1941   Generalized abdominal pain   Gastritis, presence of bleeding unspecified, unspecified chronicity, unspecified gastritis type       Medical Decision Making  78-year-old male past medical history of AAA, hypertension, gout, hyperlipidemia presenting with chief complaint of sudden onset abdominal pain.  Vital signs notable for blood pressure of 200/100, heart rate of 46, saturating normally.  Differential includes but is not limited to ACS, pneumonia, costochondritis, muscle strain. We will proceed with usual chest pain work-up including EKG, chest x-ray, CBC, CMP, serial troponin, BNP to rule out acute coronary syndrome.  Also do a CT angiogram of the abdomen and pelvis given his history of AAA and abdominal pain.    Please see ED course for the rest of the MDM.    Roger Leung DO, PGY-2  Emergency Medicine    Plan of care discussed with the attending physician.        Procedure  Procedures     Roger Leung DO  Resident  12/19/23 1941

## 2023-12-20 NOTE — DISCHARGE INSTRUCTIONS
Please follow up with cardiology, Dr. Mcneil, as soon as you are able to schedule an appointment for further evaluation and follow up of your symptoms today.  Please also follow-up with your primary care provider in the next few days.  Your work-up today did not reveal any acute, emergent findings requiring intervention that would be a cause of your symptoms.     If you have a return or worsening of symptoms including worsening chest pain, shortness of breath, lightheadedness, dizziness, sweating, chest pain at rest, worsening exercise tolerance, or any new or concerning symptoms please seek immediate medical attention or come back to the ER.

## 2023-12-22 ENCOUNTER — TELEPHONE (OUTPATIENT)
Dept: CARDIOLOGY | Facility: CLINIC | Age: 78
End: 2023-12-22
Payer: MEDICARE

## 2023-12-22 NOTE — TELEPHONE ENCOUNTER
Patient called and had CT scan Chest x-ray labs he would like you to take alook at them and advise him what to do    [phone]

## 2023-12-25 LAB
ATRIAL RATE: 42 BPM
ATRIAL RATE: 44 BPM
P AXIS: 37 DEGREES
P AXIS: 46 DEGREES
P OFFSET: 172 MS
P OFFSET: 179 MS
P ONSET: 113 MS
P ONSET: 119 MS
PR INTERVAL: 194 MS
PR INTERVAL: 202 MS
Q ONSET: 214 MS
Q ONSET: 216 MS
QRS COUNT: 7 BEATS
QRS COUNT: 7 BEATS
QRS DURATION: 86 MS
QRS DURATION: 90 MS
QT INTERVAL: 458 MS
QT INTERVAL: 460 MS
QTC CALCULATION(BAZETT): 384 MS
QTC CALCULATION(BAZETT): 391 MS
QTC FREDERICIA: 408 MS
QTC FREDERICIA: 412 MS
R AXIS: -40 DEGREES
R AXIS: -42 DEGREES
T AXIS: -1 DEGREES
T AXIS: 6 DEGREES
T OFFSET: 444 MS
T OFFSET: 445 MS
VENTRICULAR RATE: 42 BPM
VENTRICULAR RATE: 44 BPM

## 2024-01-03 ENCOUNTER — OFFICE VISIT (OUTPATIENT)
Dept: PRIMARY CARE | Facility: CLINIC | Age: 79
End: 2024-01-03
Payer: MEDICARE

## 2024-01-03 VITALS
DIASTOLIC BLOOD PRESSURE: 60 MMHG | OXYGEN SATURATION: 98 % | BODY MASS INDEX: 25.6 KG/M2 | HEART RATE: 70 BPM | WEIGHT: 189 LBS | SYSTOLIC BLOOD PRESSURE: 130 MMHG | TEMPERATURE: 97.5 F | HEIGHT: 72 IN | RESPIRATION RATE: 14 BRPM

## 2024-01-03 DIAGNOSIS — K21.9 GASTROESOPHAGEAL REFLUX DISEASE WITHOUT ESOPHAGITIS: ICD-10-CM

## 2024-01-03 DIAGNOSIS — R73.9 ELEVATED BLOOD SUGAR: ICD-10-CM

## 2024-01-03 DIAGNOSIS — I71.40 ABDOMINAL AORTIC ANEURYSM (AAA) WITHOUT RUPTURE, UNSPECIFIED PART (CMS-HCC): ICD-10-CM

## 2024-01-03 DIAGNOSIS — R00.2 INTERMITTENT PALPITATIONS: ICD-10-CM

## 2024-01-03 DIAGNOSIS — I10 PRIMARY HYPERTENSION: ICD-10-CM

## 2024-01-03 DIAGNOSIS — Z00.00 WELLNESS EXAMINATION: ICD-10-CM

## 2024-01-03 DIAGNOSIS — Z12.5 SCREENING FOR PROSTATE CANCER: ICD-10-CM

## 2024-01-03 DIAGNOSIS — Z00.00 PERIODIC HEALTH ASSESSMENT, GENERAL SCREENING, ADULT: Primary | ICD-10-CM

## 2024-01-03 DIAGNOSIS — D69.6 THROMBOCYTOPENIA (CMS-HCC): ICD-10-CM

## 2024-01-03 PROCEDURE — 1126F AMNT PAIN NOTED NONE PRSNT: CPT | Performed by: INTERNAL MEDICINE

## 2024-01-03 PROCEDURE — 3078F DIAST BP <80 MM HG: CPT | Performed by: INTERNAL MEDICINE

## 2024-01-03 PROCEDURE — 1036F TOBACCO NON-USER: CPT | Performed by: INTERNAL MEDICINE

## 2024-01-03 PROCEDURE — 1159F MED LIST DOCD IN RCRD: CPT | Performed by: INTERNAL MEDICINE

## 2024-01-03 PROCEDURE — G0439 PPPS, SUBSEQ VISIT: HCPCS | Performed by: INTERNAL MEDICINE

## 2024-01-03 PROCEDURE — 3075F SYST BP GE 130 - 139MM HG: CPT | Performed by: INTERNAL MEDICINE

## 2024-01-03 PROCEDURE — 1170F FXNL STATUS ASSESSED: CPT | Performed by: INTERNAL MEDICINE

## 2024-01-03 PROCEDURE — 99397 PER PM REEVAL EST PAT 65+ YR: CPT | Performed by: INTERNAL MEDICINE

## 2024-01-03 PROCEDURE — 1160F RVW MEDS BY RX/DR IN RCRD: CPT | Performed by: INTERNAL MEDICINE

## 2024-01-03 ASSESSMENT — ACTIVITIES OF DAILY LIVING (ADL)
DRESSING: INDEPENDENT
MANAGING_FINANCES: INDEPENDENT
BATHING: INDEPENDENT
DOING_HOUSEWORK: INDEPENDENT
GROCERY_SHOPPING: INDEPENDENT
TAKING_MEDICATION: INDEPENDENT

## 2024-01-03 ASSESSMENT — ENCOUNTER SYMPTOMS
WHEEZING: 0
PALPITATIONS: 0
DIARRHEA: 0
ABDOMINAL PAIN: 0
NAUSEA: 0
COUGH: 0
SHORTNESS OF BREATH: 0
CONSTIPATION: 0

## 2024-01-03 ASSESSMENT — PATIENT HEALTH QUESTIONNAIRE - PHQ9
2. FEELING DOWN, DEPRESSED OR HOPELESS: NOT AT ALL
1. LITTLE INTEREST OR PLEASURE IN DOING THINGS: NOT AT ALL
SUM OF ALL RESPONSES TO PHQ9 QUESTIONS 1 AND 2: 0

## 2024-01-03 NOTE — PROGRESS NOTES
Subjective   Patient ID: Mateo Berumen is a 78 y.o. male who presents for [facility] and  Medicare Annual Wellness Visit Subsequent (And follow up ER.).  He was in ER     Overall doing well.  He went to the ER with back pain, HTN and diaphoresis.  He was concerned about AAA.  We reviewed his ER results.  He has been feeling well since and has not had any recurrent episodes.  He also denies any issues with CP,SOB or dizzy spells.  No issues with anxiety, depression or sleep related problems. Denies any issues with HA, numbness or tingling.  No issues or changes with bowel or bladder habits.      Review of Systems  Otherwise unremarkable.      Objective   /60 (BP Location: Left arm, Patient Position: Sitting, BP Cuff Size: Adult)   Pulse 70   Temp 36.4 °C (97.5 °F) (Tympanic)   Resp 14   Ht 1.829 m (6')   Wt 85.7 kg (189 lb)   SpO2 98%   BMI 25.63 kg/m²     Physical Exam  Constitutional:       General: He is not in acute distress.     Appearance: Normal appearance. He is not ill-appearing.   HENT:      Head: Normocephalic and atraumatic.      Nose: Nose normal.   Eyes:      Extraocular Movements: Extraocular movements intact.      Conjunctiva/sclera: Conjunctivae normal.      Pupils: Pupils are equal, round, and reactive to light.   Cardiovascular:      Rate and Rhythm: Normal rate and regular rhythm.   Pulmonary:      Effort: Pulmonary effort is normal.      Breath sounds: Normal breath sounds.   Abdominal:      General: There is no distension.   Musculoskeletal:         General: Normal range of motion.      Cervical back: Neck supple.   Neurological:      General: No focal deficit present.      Mental Status: He is alert.      Gait: Gait normal.   Psychiatric:         Mood and Affect: Mood normal.         Behavior: Behavior normal.         Assessment/Plan   Problem List Items Addressed This Visit             ICD-10-CM    Abdominal aortic aneurysm without rupture (CMS/HCC) I71.40    Primary hypertension I10     Relevant Orders    Lipid Panel    Thrombocytopenia (CMS/[facility]) D69.6    Gastroesophageal reflux disease K21.9     Other Visit Diagnoses         Codes    Periodic health assessment, general screening, adult    -  Primary Z00.00    Wellness examination     Z00.00    Screening for prostate cancer     Z12.5    Relevant Orders    Prostate Specific Antigen    Elevated blood sugar     R73.9    Relevant Orders    Hemoglobin A1C    Thyroid Stimulating Hormone    Intermittent palpitations     R00.2    Relevant Orders    Thyroid Stimulating Hormone        Physical exam is unremarkable.  We reviewed and discussed all the above.  We discussed current medications as well as most recent test results.  We discussed the importance and benefits of a healthy diet that is both low in sugars and low in saturated fats.  We reviewed and discussed the benefits of regular physical exercise especially when at or above a level of 150 minutes/week.  We also discussed the importance of stress management and good sleep hygiene.  Annual Wellness Visit questions and answers were reviewed and discussed including the importance of discussing end of life wishes as well as having a living will and health care power of .     We will continue to work on lifestyle improvements and follow-up in 6 months, sooner if any issues should arise.

## 2024-01-03 NOTE — PROGRESS NOTES
Subjective   Patient ID: Mateo Berumen is a 78 y.o. male who presents for Medicare Annual Wellness Visit Subsequent (And follow up ER.).    HPI     Review of Systems   Respiratory:  Negative for cough, shortness of breath and wheezing.    Cardiovascular:  Negative for chest pain and palpitations.   Gastrointestinal:  Negative for abdominal pain, constipation, diarrhea and nausea.       Objective   /60 (BP Location: Left arm, Patient Position: Sitting, BP Cuff Size: Adult)   Pulse 70   Temp 36.4 °C (97.5 °F) (Tympanic)   Resp 14   Ht 1.829 m (6')   Wt 85.7 kg (189 lb)   SpO2 98%   BMI 25.63 kg/m²     Physical Exam    Assessment/Plan

## 2024-01-23 DIAGNOSIS — Z00.00 ENCOUNTER FOR GENERAL ADULT MEDICAL EXAMINATION WITHOUT ABNORMAL FINDINGS: ICD-10-CM

## 2024-01-23 RX ORDER — ALLOPURINOL 300 MG/1
300 TABLET ORAL DAILY
Qty: 90 TABLET | Refills: 3 | Status: SHIPPED | OUTPATIENT
Start: 2024-01-23

## 2024-01-29 ENCOUNTER — LAB (OUTPATIENT)
Dept: LAB | Facility: LAB | Age: 79
End: 2024-01-29
Payer: MEDICARE

## 2024-01-29 DIAGNOSIS — I10 PRIMARY HYPERTENSION: ICD-10-CM

## 2024-01-29 DIAGNOSIS — Z12.5 SCREENING FOR PROSTATE CANCER: ICD-10-CM

## 2024-01-29 DIAGNOSIS — R73.9 ELEVATED BLOOD SUGAR: ICD-10-CM

## 2024-01-29 DIAGNOSIS — K76.0 FATTY LIVER: ICD-10-CM

## 2024-01-29 DIAGNOSIS — R53.83 FATIGUE, UNSPECIFIED TYPE: ICD-10-CM

## 2024-01-29 DIAGNOSIS — R00.2 INTERMITTENT PALPITATIONS: ICD-10-CM

## 2024-01-29 DIAGNOSIS — Z00.00 PERIODIC HEALTH ASSESSMENT, GENERAL SCREENING, ADULT: ICD-10-CM

## 2024-01-29 LAB
ALBUMIN SERPL BCP-MCNC: 3.8 G/DL (ref 3.4–5)
ALP SERPL-CCNC: 61 U/L (ref 33–136)
ALT SERPL W P-5'-P-CCNC: 21 U/L (ref 10–52)
ANION GAP SERPL CALC-SCNC: 13 MMOL/L (ref 10–20)
AST SERPL W P-5'-P-CCNC: 19 U/L (ref 9–39)
BILIRUB SERPL-MCNC: 1 MG/DL (ref 0–1.2)
BUN SERPL-MCNC: 13 MG/DL (ref 6–23)
CALCIUM SERPL-MCNC: 9.2 MG/DL (ref 8.6–10.3)
CHLORIDE SERPL-SCNC: 102 MMOL/L (ref 98–107)
CHOLEST SERPL-MCNC: 145 MG/DL (ref 0–199)
CHOLESTEROL/HDL RATIO: 2.2
CO2 SERPL-SCNC: 28 MMOL/L (ref 21–32)
CREAT SERPL-MCNC: 0.98 MG/DL (ref 0.5–1.3)
EGFRCR SERPLBLD CKD-EPI 2021: 80 ML/MIN/1.73M*2
ERYTHROCYTE [DISTWIDTH] IN BLOOD BY AUTOMATED COUNT: 14.4 % (ref 11.5–14.5)
EST. AVERAGE GLUCOSE BLD GHB EST-MCNC: 88 MG/DL
GLUCOSE SERPL-MCNC: 92 MG/DL (ref 74–99)
HBA1C MFR BLD: 4.7 %
HCT VFR BLD AUTO: 41.1 % (ref 41–52)
HDLC SERPL-MCNC: 65.1 MG/DL
HGB BLD-MCNC: 14 G/DL (ref 13.5–17.5)
LDLC SERPL CALC-MCNC: 43 MG/DL
MCH RBC QN AUTO: 35.2 PG (ref 26–34)
MCHC RBC AUTO-ENTMCNC: 34.1 G/DL (ref 32–36)
MCV RBC AUTO: 103 FL (ref 80–100)
NON HDL CHOLESTEROL: 80 MG/DL (ref 0–149)
NRBC BLD-RTO: 0 /100 WBCS (ref 0–0)
PLATELET # BLD AUTO: 136 X10*3/UL (ref 150–450)
POTASSIUM SERPL-SCNC: 4 MMOL/L (ref 3.5–5.3)
PROT SERPL-MCNC: 6.7 G/DL (ref 6.4–8.2)
PSA SERPL-MCNC: 1.48 NG/ML
RBC # BLD AUTO: 3.98 X10*6/UL (ref 4.5–5.9)
SODIUM SERPL-SCNC: 139 MMOL/L (ref 136–145)
TRIGL SERPL-MCNC: 187 MG/DL (ref 0–149)
TSH SERPL-ACNC: 1.55 MIU/L (ref 0.44–3.98)
VLDL: 37 MG/DL (ref 0–40)
WBC # BLD AUTO: 7.6 X10*3/UL (ref 4.4–11.3)

## 2024-01-29 PROCEDURE — 85027 COMPLETE CBC AUTOMATED: CPT

## 2024-01-29 PROCEDURE — 84153 ASSAY OF PSA TOTAL: CPT

## 2024-01-29 PROCEDURE — 80053 COMPREHEN METABOLIC PANEL: CPT

## 2024-01-29 PROCEDURE — 36415 COLL VENOUS BLD VENIPUNCTURE: CPT

## 2024-01-29 PROCEDURE — 83036 HEMOGLOBIN GLYCOSYLATED A1C: CPT

## 2024-01-29 PROCEDURE — 84443 ASSAY THYROID STIM HORMONE: CPT

## 2024-01-29 PROCEDURE — 80061 LIPID PANEL: CPT

## 2024-02-16 ENCOUNTER — TELEPHONE (OUTPATIENT)
Dept: PRIMARY CARE | Facility: CLINIC | Age: 79
End: 2024-02-16

## 2024-02-16 ENCOUNTER — APPOINTMENT (OUTPATIENT)
Dept: PRIMARY CARE | Facility: CLINIC | Age: 79
End: 2024-02-16
Payer: MEDICARE

## 2024-02-16 NOTE — TELEPHONE ENCOUNTER
Pt got letter from dr. Brooke to redo colon.  However, pt has had recent scans and testing not sure that he needs one this soon after. Please advise when next colonoscopy should be done

## 2024-04-02 ENCOUNTER — APPOINTMENT (OUTPATIENT)
Dept: CARDIOLOGY | Facility: CLINIC | Age: 79
End: 2024-04-02
Payer: MEDICARE

## 2024-04-18 DIAGNOSIS — I10 ESSENTIAL (PRIMARY) HYPERTENSION: ICD-10-CM

## 2024-04-18 DIAGNOSIS — I10 ESSENTIAL HYPERTENSION: ICD-10-CM

## 2024-04-19 RX ORDER — CARVEDILOL 12.5 MG/1
12.5 TABLET ORAL
Qty: 180 TABLET | Refills: 3 | Status: SHIPPED | OUTPATIENT
Start: 2024-04-19

## 2024-04-27 DIAGNOSIS — Z00.00 ENCOUNTER FOR GENERAL ADULT MEDICAL EXAMINATION WITHOUT ABNORMAL FINDINGS: ICD-10-CM

## 2024-04-29 RX ORDER — FINASTERIDE 5 MG/1
5 TABLET, FILM COATED ORAL DAILY
Qty: 90 TABLET | Refills: 3 | Status: SHIPPED | OUTPATIENT
Start: 2024-04-29

## 2024-05-07 ENCOUNTER — OFFICE VISIT (OUTPATIENT)
Dept: CARDIOLOGY | Facility: CLINIC | Age: 79
End: 2024-05-07
Payer: MEDICARE

## 2024-05-07 VITALS
BODY MASS INDEX: 25.6 KG/M2 | WEIGHT: 189 LBS | HEART RATE: 55 BPM | SYSTOLIC BLOOD PRESSURE: 124 MMHG | DIASTOLIC BLOOD PRESSURE: 70 MMHG | HEIGHT: 72 IN | OXYGEN SATURATION: 98 %

## 2024-05-07 DIAGNOSIS — I87.2 VENOUS INSUFFICIENCY: ICD-10-CM

## 2024-05-07 DIAGNOSIS — I71.40 ABDOMINAL AORTIC ANEURYSM (AAA) WITHOUT RUPTURE, UNSPECIFIED PART (CMS-HCC): Primary | ICD-10-CM

## 2024-05-07 DIAGNOSIS — I71.43 INFRARENAL ABDOMINAL AORTIC ANEURYSM (AAA) WITHOUT RUPTURE (CMS-HCC): ICD-10-CM

## 2024-05-07 PROCEDURE — 1157F ADVNC CARE PLAN IN RCRD: CPT | Performed by: STUDENT IN AN ORGANIZED HEALTH CARE EDUCATION/TRAINING PROGRAM

## 2024-05-07 PROCEDURE — 1160F RVW MEDS BY RX/DR IN RCRD: CPT | Performed by: STUDENT IN AN ORGANIZED HEALTH CARE EDUCATION/TRAINING PROGRAM

## 2024-05-07 PROCEDURE — 1123F ACP DISCUSS/DSCN MKR DOCD: CPT | Performed by: STUDENT IN AN ORGANIZED HEALTH CARE EDUCATION/TRAINING PROGRAM

## 2024-05-07 PROCEDURE — 99213 OFFICE O/P EST LOW 20 MIN: CPT | Performed by: STUDENT IN AN ORGANIZED HEALTH CARE EDUCATION/TRAINING PROGRAM

## 2024-05-07 PROCEDURE — 3078F DIAST BP <80 MM HG: CPT | Performed by: STUDENT IN AN ORGANIZED HEALTH CARE EDUCATION/TRAINING PROGRAM

## 2024-05-07 PROCEDURE — 3074F SYST BP LT 130 MM HG: CPT | Performed by: STUDENT IN AN ORGANIZED HEALTH CARE EDUCATION/TRAINING PROGRAM

## 2024-05-07 PROCEDURE — 1159F MED LIST DOCD IN RCRD: CPT | Performed by: STUDENT IN AN ORGANIZED HEALTH CARE EDUCATION/TRAINING PROGRAM

## 2024-05-07 NOTE — PROGRESS NOTES
Chief complaint:   Chief Complaint   Patient presents with    Follow-up        History of Present Illness  Mateo Berumen is a 78 y.o. year old male patient with history of hypertension, dyslipidemia, chronic venous insufficiency and saccular infrarenal AAA (3.5 cm) who is presenting for vascular follow-up.     Patient reports to be doing well and reports no new symptoms. Denies abdominal pain, claudication, rest pain or foot wounds. He does have mild lower extremity swelling that he reports is controlled with compression stockings.      Of note he does have a family history of aneurysms, reports his father was a longtime smoker and had had surgery for repair of AAA.        Social History     Tobacco Use    Smoking status: Never    Smokeless tobacco: Never   Vaping Use    Vaping status: Never Used   Substance Use Topics    Alcohol use: Not Currently     Alcohol/week: 14.0 standard drinks of alcohol     Types: 7 Glasses of wine, 7 Standard drinks or equivalent per week    Drug use: Yes     Types: Marijuana       Outpatient Medications:  Current Outpatient Medications   Medication Instructions    allopurinol (ZYLOPRIM) 300 mg, oral, Daily    atorvastatin (LIPITOR) 40 mg, oral, Daily    carvedilol (COREG) 12.5 mg, oral, 2 times daily with meals    finasteride (PROSCAR) 5 mg, oral, Daily    losartan-hydrochlorothiazide (Hyzaar) 100-12.5 mg tablet 1 tablet, oral, Daily    omeprazole (PRILOSEC) 10 mg, oral, Daily    pantoprazole (PROTONIX) 40 mg, oral, 2 times daily, Do not crush, chew, or split.         Vitals:  Vitals:    05/07/24 1408   BP: 124/70   Pulse: 55   SpO2: 98%       Physical Exam:  General: NAD, well-appearing  HEENT: moist mucous membranes, no jaundice  Neck: No JVD, no carotid bruit  Lungs: CTA rowena, no wheezing or rales  Cardiac: RRR, no murmurs  Abdomen: soft, non-tender, non-distended  Extremities: 2+ radial pulses, trace ankle edema  Skin: warm, dry, no wound  Neurologic: AAOx3,  no focal  deficits        Assessment/Plan       #Small AAA  --Prior CTA from April 2021 reviewed confirming an infrarenal saccular AAA with maximal diameter of 3.5 cm. Most recent ultrasound this year reviewed showing stable small AAA measuring 3.5 cm in maximal diameter  -Overall small aneurysm that has been stable, recommend repeat ultrasound in 2 years    #Leg edema  -Continue compression stockings for venous insufficiency      Martha Mayberry MD [facility]  Interventional Cardiology  Endovascular Interventions  [email or web address]    **Disclaimer: This note was dictated by speech recognition, and every effort has been made to prevent any error in transcription, however minor errors may be present**

## 2024-05-13 ENCOUNTER — OFFICE VISIT (OUTPATIENT)
Dept: PRIMARY CARE | Facility: CLINIC | Age: 79
End: 2024-05-13
Payer: MEDICARE

## 2024-05-13 VITALS
SYSTOLIC BLOOD PRESSURE: 130 MMHG | OXYGEN SATURATION: 98 % | TEMPERATURE: 97.3 F | WEIGHT: 185 LBS | DIASTOLIC BLOOD PRESSURE: 80 MMHG | HEART RATE: 68 BPM | BODY MASS INDEX: 25.06 KG/M2 | RESPIRATION RATE: 14 BRPM | HEIGHT: 72 IN

## 2024-05-13 DIAGNOSIS — I49.3 PVC (PREMATURE VENTRICULAR CONTRACTION): ICD-10-CM

## 2024-05-13 DIAGNOSIS — G57.01 PIRIFORMIS SYNDROME OF RIGHT SIDE: ICD-10-CM

## 2024-05-13 DIAGNOSIS — I10 PRIMARY HYPERTENSION: Primary | ICD-10-CM

## 2024-05-13 PROCEDURE — 1123F ACP DISCUSS/DSCN MKR DOCD: CPT | Performed by: INTERNAL MEDICINE

## 2024-05-13 PROCEDURE — 3079F DIAST BP 80-89 MM HG: CPT | Performed by: INTERNAL MEDICINE

## 2024-05-13 PROCEDURE — 1157F ADVNC CARE PLAN IN RCRD: CPT | Performed by: INTERNAL MEDICINE

## 2024-05-13 PROCEDURE — 1036F TOBACCO NON-USER: CPT | Performed by: INTERNAL MEDICINE

## 2024-05-13 PROCEDURE — 1160F RVW MEDS BY RX/DR IN RCRD: CPT | Performed by: INTERNAL MEDICINE

## 2024-05-13 PROCEDURE — 3075F SYST BP GE 130 - 139MM HG: CPT | Performed by: INTERNAL MEDICINE

## 2024-05-13 PROCEDURE — 99213 OFFICE O/P EST LOW 20 MIN: CPT | Performed by: INTERNAL MEDICINE

## 2024-05-13 PROCEDURE — 93000 ELECTROCARDIOGRAM COMPLETE: CPT | Performed by: INTERNAL MEDICINE

## 2024-05-13 PROCEDURE — 1159F MED LIST DOCD IN RCRD: CPT | Performed by: INTERNAL MEDICINE

## 2024-05-13 ASSESSMENT — ENCOUNTER SYMPTOMS
MYALGIAS: 1
COUGH: 0
ARTHRALGIAS: 0
SHORTNESS OF BREATH: 0
DIARRHEA: 0
CONSTIPATION: 0
NAUSEA: 0
PALPITATIONS: 0
WHEEZING: 0
ABDOMINAL PAIN: 0

## 2024-05-13 NOTE — PROGRESS NOTES
Subjective   Patient ID: Mateo Berumen is a 78 y.o. male who presents for Hypertension.    He has been feeling ok, with the exception of pain in his buttock after sitting for awhile in his car.  Shooting pain down to knee.  Once he stands for a bit symptoms fade away.    He continues to feel an extra beat from time to time.  No exertional CP.  In particular no CP or SOB when mowing lawn or going up stairs etc.     Review of Systems   Respiratory:  Negative for cough, shortness of breath and wheezing.    Cardiovascular:  Negative for palpitations.   Gastrointestinal:  Negative for abdominal pain, constipation, diarrhea and nausea.   Musculoskeletal:  Positive for myalgias. Negative for arthralgias.       Objective   /80 (BP Location: Left arm, Patient Position: Sitting, BP Cuff Size: Adult)   Pulse 68   Temp 36.3 °C (97.3 °F) (Tympanic)   Resp 14   Ht 1.829 m (6')   Wt 83.9 kg (185 lb)   SpO2 98%   BMI 25.09 kg/m²     Physical Exam  Vitals reviewed.   Constitutional:       Appearance: Normal appearance.   HENT:      Head: Normocephalic.   Cardiovascular:      Rate and Rhythm: Normal rate.   Pulmonary:      Effort: Pulmonary effort is normal.   Musculoskeletal:         General: Normal range of motion.   Neurological:      General: No focal deficit present.      Mental Status: He is alert.   Psychiatric:         Mood and Affect: Mood normal.         Assessment/Plan   Problem List Items Addressed This Visit             ICD-10-CM    Primary hypertension - Primary I10    Relevant Orders    ECG 12 Lead (Completed)     Other Visit Diagnoses         Codes    Piriformis syndrome of right side     G57.01    PVC (premature ventricular contraction)     I49.3        Piriformis syndrome on right side.  Discussed heat, massage and stretching.  We will also start ibuprofen 600 mg TID with pepcid 10-20 mg TID.    EKG done.  No acute changes.  + PVC.  He does follow with cardiology.  If any CP develops  he is to go to  [facility]

## 2024-06-04 ENCOUNTER — OFFICE VISIT (OUTPATIENT)
Dept: CARDIOLOGY | Facility: CLINIC | Age: 79
End: 2024-06-04
Payer: MEDICARE

## 2024-06-04 VITALS
BODY MASS INDEX: 25.76 KG/M2 | HEIGHT: 71 IN | OXYGEN SATURATION: 98 % | WEIGHT: 184 LBS | DIASTOLIC BLOOD PRESSURE: 68 MMHG | SYSTOLIC BLOOD PRESSURE: 122 MMHG | HEART RATE: 63 BPM

## 2024-06-04 DIAGNOSIS — I87.2 VENOUS INSUFFICIENCY: ICD-10-CM

## 2024-06-04 DIAGNOSIS — R94.31 ABNORMAL ECG: ICD-10-CM

## 2024-06-04 DIAGNOSIS — I71.40 ABDOMINAL AORTIC ANEURYSM (AAA) WITHOUT RUPTURE, UNSPECIFIED PART (CMS-HCC): ICD-10-CM

## 2024-06-04 DIAGNOSIS — E78.5 DYSLIPIDEMIA: Primary | ICD-10-CM

## 2024-06-04 DIAGNOSIS — I10 PRIMARY HYPERTENSION: ICD-10-CM

## 2024-06-04 PROCEDURE — 1123F ACP DISCUSS/DSCN MKR DOCD: CPT | Performed by: INTERNAL MEDICINE

## 2024-06-04 PROCEDURE — 99214 OFFICE O/P EST MOD 30 MIN: CPT | Performed by: INTERNAL MEDICINE

## 2024-06-04 PROCEDURE — 3074F SYST BP LT 130 MM HG: CPT | Performed by: INTERNAL MEDICINE

## 2024-06-04 PROCEDURE — 1159F MED LIST DOCD IN RCRD: CPT | Performed by: INTERNAL MEDICINE

## 2024-06-04 PROCEDURE — 3078F DIAST BP <80 MM HG: CPT | Performed by: INTERNAL MEDICINE

## 2024-06-04 PROCEDURE — 1160F RVW MEDS BY RX/DR IN RCRD: CPT | Performed by: INTERNAL MEDICINE

## 2024-06-04 PROCEDURE — 1036F TOBACCO NON-USER: CPT | Performed by: INTERNAL MEDICINE

## 2024-06-04 PROCEDURE — 1157F ADVNC CARE PLAN IN RCRD: CPT | Performed by: INTERNAL MEDICINE

## 2024-06-04 NOTE — PROGRESS NOTES
Chief Complaint:   No chief complaint on file.     History Of Present Illness:    Mateo Berumen is a 78 y.o. male with a history of AAA, hypertension, dyslipidemia, abnormal ECG (anterior and inferior Q waves with low voltage in the precordial leads), palpitations and atypical chest pain being evaluated for routine follow-up.      Overall doing well.  Denies any exertional chest pain or shortness of breath.  Denies any palpitations.    Still golfing.    Has been suffering from pain in the right upper posterior leg thought to be secondary to sciatica.  No claudication of the lower extremity.     I reviewed the note by Dr. Mayberry in May 2024 for surveillance of his infrarenal saccular AAA.    Had a sleep study which demonstrated mild to moderate sleep apnea.  He spoke to the specialist and they have decided to hold off on CPAP for now.    CT angio abdomen with contrast demonstrating infrarenal aorta measuring 3.7 x 2.9 cm (previously 3.5 x 2.6 cm)     Abdominal aortic duplex 9/15/23: No change in mid AAA 3.57 x 3.48 cm     Echocardiogram 3/29/23:  EF 60-65%. Mild MR and trace TR. Trivial pericardial effusion.     Sleep study March 2023 demonstrated moderate sleep apnea.     Treadmill nuclear stress test 8/24/18 demonstrating no ECG evidence of ischemia. No nuclear evidence of ischemia or scar. EF 73%. Low likelihood of flow-limiting coronary artery disease.     Past Medical History:  He has a past medical history of Abdominal aortic aneurysm without rupture (CMS-HCC) (09/29/2022), Essential (primary) hypertension (11/16/2022), Personal history of other diseases of male genital organs (05/12/2020), Personal history of other diseases of the musculoskeletal system and connective tissue, Personal history of other diseases of the nervous system and sense organs, Personal history of other diseases of the nervous system and sense organs, Personal history of other endocrine, nutritional and metabolic disease, and Venous  "insufficiency (chronic) (peripheral).    Past Surgical History:  He has a past surgical history that includes Other surgical history (02/03/2016) and CT angio abdomen w and or wo IV IV contrast (12/19/2023).      Social History:  He reports that he has never smoked. He has never used smokeless tobacco. He reports current alcohol use of about 14.0 standard drinks of alcohol per week. He reports that he does not currently use drugs after having used the following drugs: Marijuana.    Family History:  Family History   Problem Relation Name Age of Onset    Cancer Mother      Cancer Father      Heart attack Father      No Known Problems Sister      No Known Problems Brother          Allergies:  Patient has no known allergies.    Outpatient Medications:  Current Outpatient Medications   Medication Instructions    allopurinol (ZYLOPRIM) 300 mg, oral, Daily    atorvastatin (LIPITOR) 40 mg, oral, Daily    carvedilol (COREG) 12.5 mg, oral, 2 times daily (morning and late afternoon)    finasteride (PROSCAR) 5 mg, oral, Daily    losartan-hydrochlorothiazide (Hyzaar) 100-12.5 mg tablet 1 tablet, oral, Daily    omeprazole (PRILOSEC) 10 mg, oral, Daily       Last Recorded Vitals:  Visit Vitals  /68 (BP Location: Left arm, Patient Position: Sitting)   Pulse 63   Ht 1.803 m (5' 11\")   Wt 83.5 kg (184 lb)   SpO2 98%   BMI 25.66 kg/m²   Smoking Status Never   BSA 2.04 m²      LASTWT(3):   Wt Readings from Last 3 Encounters:   06/04/24 83.5 kg (184 lb)   05/13/24 83.9 kg (185 lb)   05/07/24 85.7 kg (189 lb)       Physical Exam:  In general: alert and in no acute distress.   HEENT: Carotid upstrokes normal with no bruits. JVP is normal.   Pulmonary: Clear to auscultation bilaterally.  Cardiovascular: S1,S2, regular. No appreciable murmurs, rubs or gallops.   Lower extremities: Warm.  1+ distal pulses. No edema.       Last Labs:  CBC -  Recent Labs     01/29/24  1321 12/19/23  1455 01/11/23  1329   WBC 7.6 9.0 7.9   HGB 14.0 14.4 " 15.3   HCT 41.1 43.1 44.6   * 148* 133*   * 102* 100       CMP -  Recent Labs     01/29/24  1321 12/19/23  1455 08/15/23  1136    139 140   K 4.0 4.1 4.1    104 103   CO2 28 29 28   ANIONGAP 13 10 13   BUN 13 20 17   CREATININE 0.98 0.98 0.97   EGFR 80 80  --    MG  --  1.36*  --      Recent Labs     01/29/24  1321 12/19/23  1455 01/11/23  1329   ALBUMIN 3.8 4.1 4.0   ALKPHOS 61 58 63   ALT 21 19 33   AST 19 19 26   BILITOT 1.0 0.9 0.7       LIPID PANEL -   Recent Labs     01/29/24  1321 01/11/23  1329 07/01/21  1413 11/12/20  0950   CHOL 145 152 142 120   LDLCALC 43  --   --   --    LDLF  --  57 52 20   HDL 65.1 69.6 63.0 50.0   TRIG 187* 129 135 252*       Recent Labs     01/29/24  1321 08/16/23  0926   HGBA1C 4.7 5.6           Assessment/Plan   1) dyslipidemia: Well-controlled.  No changes needed.     2) hypertension: Blood pressure well-controlled.  No changes needed.     3) abdominal aortic aneurysm: Continue to follow with vascular.     4) abnormal ECG: Unchanged.     5) sleep apnea: We talked about whether or not to try CPAP.  He is not having any significant daytime somnolence.  He is having vivid dreams however I do not believe this would be an indication for CPAP.     6) lower extremity edema: Resolved     7) follow-up: 12 months or sooner if needed.        Cruzito Mcneil MD

## 2024-06-12 DIAGNOSIS — E78.5 HYPERLIPIDEMIA, UNSPECIFIED: ICD-10-CM

## 2024-06-12 RX ORDER — ATORVASTATIN CALCIUM 40 MG/1
40 TABLET, FILM COATED ORAL DAILY
Qty: 90 TABLET | Refills: 3 | Status: SHIPPED | OUTPATIENT
Start: 2024-06-12

## 2024-09-18 DIAGNOSIS — I10 PRIMARY HYPERTENSION: ICD-10-CM

## 2024-09-18 RX ORDER — LOSARTAN POTASSIUM AND HYDROCHLOROTHIAZIDE 12.5; 1 MG/1; MG/1
1 TABLET ORAL DAILY
Qty: 90 TABLET | Refills: 3 | Status: SHIPPED | OUTPATIENT
Start: 2024-09-18

## 2024-10-22 ENCOUNTER — APPOINTMENT (OUTPATIENT)
Dept: CARDIOLOGY | Facility: CLINIC | Age: 79
End: 2024-10-22
Payer: MEDICARE

## 2025-01-16 DIAGNOSIS — Z00.00 ENCOUNTER FOR GENERAL ADULT MEDICAL EXAMINATION WITHOUT ABNORMAL FINDINGS: ICD-10-CM

## 2025-01-16 RX ORDER — ALLOPURINOL 300 MG/1
300 TABLET ORAL DAILY
Qty: 90 TABLET | Refills: 3 | Status: SHIPPED | OUTPATIENT
Start: 2025-01-16

## 2025-01-30 DIAGNOSIS — Z00.00 ENCOUNTER FOR GENERAL ADULT MEDICAL EXAMINATION WITHOUT ABNORMAL FINDINGS: ICD-10-CM

## 2025-01-31 RX ORDER — FINASTERIDE 5 MG/1
5 TABLET, FILM COATED ORAL DAILY
Qty: 90 TABLET | Refills: 3 | Status: SHIPPED | OUTPATIENT
Start: 2025-01-31

## 2025-04-16 DIAGNOSIS — I10 ESSENTIAL (PRIMARY) HYPERTENSION: ICD-10-CM

## 2025-04-16 DIAGNOSIS — I10 ESSENTIAL HYPERTENSION: ICD-10-CM

## 2025-04-16 RX ORDER — CARVEDILOL 12.5 MG/1
12.5 TABLET ORAL
Qty: 180 TABLET | Refills: 3 | Status: SHIPPED | OUTPATIENT
Start: 2025-04-16

## 2025-04-23 DIAGNOSIS — I71.40 ABDOMINAL AORTIC ANEURYSM (AAA) WITHOUT RUPTURE, UNSPECIFIED PART: ICD-10-CM

## 2025-04-23 DIAGNOSIS — R09.89 OTHER SPECIFIED SYMPTOMS AND SIGNS INVOLVING THE CIRCULATORY AND RESPIRATORY SYSTEMS: ICD-10-CM

## 2025-05-08 ENCOUNTER — HOSPITAL ENCOUNTER (OUTPATIENT)
Dept: CARDIOLOGY | Facility: HOSPITAL | Age: 80
Discharge: HOME | End: 2025-05-08
Payer: MEDICARE

## 2025-05-08 DIAGNOSIS — I71.43 INFRARENAL ABDOMINAL AORTIC ANEURYSM (AAA) WITHOUT RUPTURE: ICD-10-CM

## 2025-05-08 DIAGNOSIS — I71.40 ABDOMINAL AORTIC ANEURYSM (AAA) WITHOUT RUPTURE, UNSPECIFIED PART: ICD-10-CM

## 2025-05-08 PROCEDURE — 93978 VASCULAR STUDY: CPT | Performed by: INTERNAL MEDICINE

## 2025-05-08 PROCEDURE — 93978 VASCULAR STUDY: CPT

## 2025-05-12 ENCOUNTER — TELEPHONE (OUTPATIENT)
Dept: PEDIATRICS | Facility: CLINIC | Age: 80
End: 2025-05-12
Payer: MEDICARE

## 2025-05-12 NOTE — TELEPHONE ENCOUNTER
Pt came into [place] office to inquire about a referral to a podiatrist regarding plantar fasciitis. I scheduled him an appt with Dr. Issa for Wednesday, but he wanted me to send a message as well in case he'd prefer pt see a specialist first.

## 2025-05-13 ENCOUNTER — APPOINTMENT (OUTPATIENT)
Dept: CARDIOLOGY | Facility: CLINIC | Age: 80
End: 2025-05-13
Payer: MEDICARE

## 2025-05-13 VITALS
SYSTOLIC BLOOD PRESSURE: 150 MMHG | DIASTOLIC BLOOD PRESSURE: 80 MMHG | OXYGEN SATURATION: 96 % | HEIGHT: 71 IN | BODY MASS INDEX: 25.9 KG/M2 | HEART RATE: 56 BPM | WEIGHT: 185 LBS

## 2025-05-13 VITALS
OXYGEN SATURATION: 98 % | WEIGHT: 185 LBS | DIASTOLIC BLOOD PRESSURE: 86 MMHG | HEART RATE: 56 BPM | HEIGHT: 71 IN | SYSTOLIC BLOOD PRESSURE: 152 MMHG | BODY MASS INDEX: 25.9 KG/M2

## 2025-05-13 DIAGNOSIS — E78.5 DYSLIPIDEMIA: ICD-10-CM

## 2025-05-13 DIAGNOSIS — R94.31 ABNORMAL ECG: ICD-10-CM

## 2025-05-13 DIAGNOSIS — I71.40 ABDOMINAL AORTIC ANEURYSM (AAA) WITHOUT RUPTURE, UNSPECIFIED PART: Primary | ICD-10-CM

## 2025-05-13 DIAGNOSIS — I10 PRIMARY HYPERTENSION: ICD-10-CM

## 2025-05-13 DIAGNOSIS — I71.43 INFRARENAL ABDOMINAL AORTIC ANEURYSM (AAA) WITHOUT RUPTURE: ICD-10-CM

## 2025-05-13 PROCEDURE — 3079F DIAST BP 80-89 MM HG: CPT | Performed by: INTERNAL MEDICINE

## 2025-05-13 PROCEDURE — 1036F TOBACCO NON-USER: CPT | Performed by: INTERNAL MEDICINE

## 2025-05-13 PROCEDURE — 99214 OFFICE O/P EST MOD 30 MIN: CPT | Performed by: INTERNAL MEDICINE

## 2025-05-13 PROCEDURE — 99214 OFFICE O/P EST MOD 30 MIN: CPT | Performed by: STUDENT IN AN ORGANIZED HEALTH CARE EDUCATION/TRAINING PROGRAM

## 2025-05-13 PROCEDURE — 3077F SYST BP >= 140 MM HG: CPT | Performed by: STUDENT IN AN ORGANIZED HEALTH CARE EDUCATION/TRAINING PROGRAM

## 2025-05-13 PROCEDURE — 93005 ELECTROCARDIOGRAM TRACING: CPT | Performed by: INTERNAL MEDICINE

## 2025-05-13 PROCEDURE — 3077F SYST BP >= 140 MM HG: CPT | Performed by: INTERNAL MEDICINE

## 2025-05-13 PROCEDURE — 99214 OFFICE O/P EST MOD 30 MIN: CPT | Mod: 25 | Performed by: INTERNAL MEDICINE

## 2025-05-13 PROCEDURE — 1159F MED LIST DOCD IN RCRD: CPT | Performed by: INTERNAL MEDICINE

## 2025-05-13 PROCEDURE — 1159F MED LIST DOCD IN RCRD: CPT | Performed by: STUDENT IN AN ORGANIZED HEALTH CARE EDUCATION/TRAINING PROGRAM

## 2025-05-13 PROCEDURE — 1157F ADVNC CARE PLAN IN RCRD: CPT | Performed by: INTERNAL MEDICINE

## 2025-05-13 PROCEDURE — 3079F DIAST BP 80-89 MM HG: CPT | Performed by: STUDENT IN AN ORGANIZED HEALTH CARE EDUCATION/TRAINING PROGRAM

## 2025-05-13 PROCEDURE — 1123F ACP DISCUSS/DSCN MKR DOCD: CPT | Performed by: INTERNAL MEDICINE

## 2025-05-13 PROCEDURE — 1160F RVW MEDS BY RX/DR IN RCRD: CPT | Performed by: INTERNAL MEDICINE

## 2025-05-13 PROCEDURE — 93010 ELECTROCARDIOGRAM REPORT: CPT | Performed by: INTERNAL MEDICINE

## 2025-05-13 PROCEDURE — 1125F AMNT PAIN NOTED PAIN PRSNT: CPT | Performed by: STUDENT IN AN ORGANIZED HEALTH CARE EDUCATION/TRAINING PROGRAM

## 2025-05-13 RX ORDER — DEXTROMETHORPHAN HYDROBROMIDE, GUAIFENESIN 5; 100 MG/5ML; MG/5ML
650 LIQUID ORAL EVERY 8 HOURS PRN
COMMUNITY

## 2025-05-13 ASSESSMENT — ENCOUNTER SYMPTOMS
DEPRESSION: 0
OCCASIONAL FEELINGS OF UNSTEADINESS: 0
LOSS OF SENSATION IN FEET: 0

## 2025-05-13 ASSESSMENT — PAIN SCALES - GENERAL: PAINLEVEL_OUTOF10: 6

## 2025-05-13 NOTE — PROGRESS NOTES
Chief complaint:   No chief complaint on file.       History of Present Illness  Mateo Berumen is a 79 y.o. year old male patient with history of hypertension, dyslipidemia, chronic venous insufficiency and saccular infrarenal AAA (3.5 cm) who is presenting for vascular follow-up.     Patient reports to be doing well and reports no new symptoms. Denies abdominal pain, claudication, rest pain or foot wounds. He does have mild lower extremity swelling that he reports is controlled with compression stockings.      Of note he does have a family history of aneurysms, reports his father was a longtime smoker and had had surgery for repair of AAA.        Social History     Tobacco Use    Smoking status: Never    Smokeless tobacco: Never   Vaping Use    Vaping status: Never Used   Substance Use Topics    Alcohol use: Yes     Alcohol/week: 7.0 standard drinks of alcohol     Types: 7 Glasses of wine per week    Drug use: Yes     Types: Marijuana       Outpatient Medications:  Current Outpatient Medications   Medication Instructions    acetaminophen (TYLENOL 8 HOUR) 650 mg, Every 8 hours PRN    allopurinol (ZYLOPRIM) 300 mg, oral, Daily    atorvastatin (LIPITOR) 40 mg, oral, Daily    carvedilol (COREG) 12.5 mg, oral, 2 times daily (morning and late afternoon)    finasteride (PROSCAR) 5 mg, oral, Daily    losartan-hydrochlorothiazide (Hyzaar) 100-12.5 mg tablet 1 tablet, oral, Daily    omeprazole (PRILOSEC) 10 mg, oral, As needed         Vitals:  Vitals:    05/13/25 1412   BP: 152/86   Pulse: 56   SpO2: 98%       Physical Exam:  General: NAD, well-appearing  HEENT: moist mucous membranes, no jaundice  Neck: No JVD, no carotid bruit  Lungs: CTA rowena, no wheezing or rales  Cardiac: RRR, no murmurs  Abdomen: soft, non-tender, non-distended  Extremities: 2+ radial pulses, trace ankle edema  Skin: warm, dry, no wound  Neurologic: AAOx3,  no focal deficits        Assessment/Plan       #Small AAA  --Prior CTA from April 2021  reviewed confirming an infrarenal saccular AAA with maximal diameter of 3.5 cm. Most recent ultrasound this year reviewed showing stable small AAA measuring 3.5 cm in maximal diameter  -Overall small aneurysm that has been stable, recommend repeat ultrasound in 2 years    #Leg edema  -Continue compression stockings for venous insufficiency      Martha Mayberry MD [facility]  Interventional Cardiology  Endovascular Interventions  [email or web address]    **Disclaimer: This note was dictated by speech recognition, and every effort has been made to prevent any error in transcription, however minor errors may be present**

## 2025-05-13 NOTE — PROGRESS NOTES
Chief Complaint:   No chief complaint on file.     History Of Present Illness:    Mateo Berumen is a 79 y.o. male with a history of AAA, hypertension, dyslipidemia, abnormal ECG (anterior and inferior Q waves with low voltage in the precordial leads), palpitations and atypical chest pain being evaluated for routine follow-up.      Overall doing well.  Is suffering from plantar fasciitis of the left foot and has had a very limited time with activity since then.  Has not been able to golf.  Will be seeing his primary care physician soon about it.  He has been trying every suggestion that he can find online without avail.    He is also using Voltaren which he has been using for the last 3 weeks.  He has noticed that his blood pressure has been creeping up over the last couple of weeks as well.     I reviewed the note by Dr. Mayberry in May 2024 for surveillance of his infrarenal saccular AAA.    Had a sleep study which demonstrated mild to moderate sleep apnea.  He spoke to the specialist and they have decided to hold off on CPAP for now.    CT angio abdomen with contrast demonstrating infrarenal aorta measuring 3.7 x 2.9 cm (previously 3.5 x 2.6 cm)     Abdominal aortic duplex 9/15/23: No change in mid AAA 3.57 x 3.48 cm     Echocardiogram 3/29/23:  EF 60-65%. Mild MR and trace TR. Trivial pericardial effusion.     Sleep study March 2023 demonstrated moderate sleep apnea.     Treadmill nuclear stress test 8/24/18 demonstrating no ECG evidence of ischemia. No nuclear evidence of ischemia or scar. EF 73%. Low likelihood of flow-limiting coronary artery disease.     Past Medical History:  He has a past medical history of Abdominal aortic aneurysm without rupture (09/29/2022), Essential (primary) hypertension (11/16/2022), Personal history of other diseases of male genital organs (05/12/2020), Personal history of other diseases of the musculoskeletal system and connective tissue, Personal history of other diseases of  "the nervous system and sense organs, Personal history of other diseases of the nervous system and sense organs, Personal history of other endocrine, nutritional and metabolic disease, and Venous insufficiency (chronic) (peripheral).    Past Surgical History:  He has a past surgical history that includes Other surgical history (02/03/2016) and CT angio abdomen w and or wo IV IV contrast (12/19/2023).      Social History:  He reports that he has never smoked. He has never used smokeless tobacco. He reports current alcohol use of about 7.0 standard drinks of alcohol per week. He reports current drug use. Drug: Marijuana.    Family History:  Family History   Problem Relation Name Age of Onset    Cancer Mother      Cancer Father      Heart attack Father      No Known Problems Sister      No Known Problems Brother          Allergies:  Patient has no known allergies.    Outpatient Medications:  Current Outpatient Medications   Medication Instructions    acetaminophen (TYLENOL 8 HOUR) 650 mg, Every 8 hours PRN    allopurinol (ZYLOPRIM) 300 mg, oral, Daily    atorvastatin (LIPITOR) 40 mg, oral, Daily    carvedilol (COREG) 12.5 mg, oral, 2 times daily (morning and late afternoon)    finasteride (PROSCAR) 5 mg, oral, Daily    losartan-hydrochlorothiazide (Hyzaar) 100-12.5 mg tablet 1 tablet, oral, Daily    omeprazole (PRILOSEC) 10 mg, oral, As needed       Last Recorded Vitals:  Visit Vitals  /80 (BP Location: Left arm, Patient Position: Sitting)   Pulse 56   Ht 1.803 m (5' 11\")   Wt 83.9 kg (185 lb)   SpO2 96%   BMI 25.80 kg/m²   Smoking Status Never   BSA 2.05 m²      LASTWT(3):   Wt Readings from Last 3 Encounters:   05/13/25 83.9 kg (185 lb)   05/13/25 83.9 kg (185 lb)   06/04/24 83.5 kg (184 lb)       Physical Exam:  In general: alert and in no acute distress.   HEENT: Carotid upstrokes normal with no bruits. JVP is normal.   Pulmonary: Clear to auscultation bilaterally.  Cardiovascular: S1,S2, regular. No " appreciable murmurs, rubs or gallops.   Lower extremities: Warm.  1+ distal pulses.  Trivial bipedal edema.       Last Labs:  CBC -  Recent Labs     01/29/24  1321 12/19/23  1455 01/11/23  1329   WBC 7.6 9.0 7.9   HGB 14.0 14.4 15.3   HCT 41.1 43.1 44.6   * 148* 133*   * 102* 100       CMP -  Recent Labs     01/29/24  1321 12/19/23  1455 08/15/23  1136    139 140   K 4.0 4.1 4.1    104 103   CO2 28 29 28   ANIONGAP 13 10 13   BUN 13 20 17   CREATININE 0.98 0.98 0.97   EGFR 80 80  --    MG  --  1.36*  --      Recent Labs     01/29/24  1321 12/19/23  1455 01/11/23  1329   ALBUMIN 3.8 4.1 4.0   ALKPHOS 61 58 63   ALT 21 19 33   AST 19 19 26   BILITOT 1.0 0.9 0.7       LIPID PANEL -   Recent Labs     01/29/24  1321 01/11/23  1329 07/01/21  1413 11/12/20  0950   CHOL 145 152 142 120   LDLCALC 43  --   --   --    LDLF  --  57 52 20   HDL 65.1 69.6 63.0 50.0   TRIG 187* 129 135 252*       Recent Labs     01/29/24  1321 08/16/23  0926   HGBA1C 4.7 5.6           Assessment/Plan   1) dyslipidemia: DL less than 70.  Remains on atorvastatin 40 mg daily which I recommend.  This is also being used to help decrease chance of expansion of the abdominal aortic aneurysm.     2) hypertension: Blood pressure starkly better controlled.  More recently elevated which might be secondary to pain, disturbed sleep, or potentially from his Voltaren use.  He has not noticed the benefit from the Voltaren.  Did ask him to stop the Voltaren and check blood pressures over the ensuing 2 to 3 weeks.  If elevated then would consider increasing the carvedilol to 25 mg twice a day or adding hydrochlorothiazide 12.5 mg daily to his current regimen.    If he notices continued disturbed sleep he should consider repeat sleep study.     3) abdominal aortic aneurysm: Continue to follow with vascular.     4) abnormal ECG: Unchanged.     5) sleep apnea: See discussion above.     6) follow-up: 12 months or sooner if needed.        Cruzito  KAREEM Mcneil MD

## 2025-05-14 ENCOUNTER — OFFICE VISIT (OUTPATIENT)
Dept: PRIMARY CARE | Facility: CLINIC | Age: 80
End: 2025-05-14
Payer: MEDICARE

## 2025-05-14 VITALS
WEIGHT: 192 LBS | OXYGEN SATURATION: 97 % | RESPIRATION RATE: 14 BRPM | BODY MASS INDEX: 26.88 KG/M2 | DIASTOLIC BLOOD PRESSURE: 80 MMHG | SYSTOLIC BLOOD PRESSURE: 130 MMHG | HEIGHT: 71 IN | HEART RATE: 61 BPM | TEMPERATURE: 97.4 F

## 2025-05-14 DIAGNOSIS — R35.0 BENIGN PROSTATIC HYPERPLASIA WITH URINARY FREQUENCY: ICD-10-CM

## 2025-05-14 DIAGNOSIS — E78.5 DYSLIPIDEMIA: ICD-10-CM

## 2025-05-14 DIAGNOSIS — M25.50 ARTHRALGIA, UNSPECIFIED JOINT: ICD-10-CM

## 2025-05-14 DIAGNOSIS — Z00.00 ROUTINE GENERAL MEDICAL EXAMINATION AT HEALTH CARE FACILITY: Primary | ICD-10-CM

## 2025-05-14 DIAGNOSIS — M10.00 IDIOPATHIC GOUT, UNSPECIFIED CHRONICITY, UNSPECIFIED SITE: ICD-10-CM

## 2025-05-14 DIAGNOSIS — Z13.1 DIABETES MELLITUS SCREENING: ICD-10-CM

## 2025-05-14 DIAGNOSIS — Z13.220 SCREENING FOR HYPERLIPIDEMIA: ICD-10-CM

## 2025-05-14 DIAGNOSIS — Z12.5 SCREENING FOR PROSTATE CANCER: ICD-10-CM

## 2025-05-14 DIAGNOSIS — I10 PRIMARY HYPERTENSION: ICD-10-CM

## 2025-05-14 DIAGNOSIS — R53.83 OTHER FATIGUE: ICD-10-CM

## 2025-05-14 DIAGNOSIS — M72.2 PLANTAR FASCIITIS OF LEFT FOOT: ICD-10-CM

## 2025-05-14 DIAGNOSIS — D69.6 THROMBOCYTOPENIA: ICD-10-CM

## 2025-05-14 DIAGNOSIS — N40.1 BENIGN PROSTATIC HYPERPLASIA WITH URINARY FREQUENCY: ICD-10-CM

## 2025-05-14 DIAGNOSIS — I71.40 ABDOMINAL AORTIC ANEURYSM (AAA) WITHOUT RUPTURE, UNSPECIFIED PART: ICD-10-CM

## 2025-05-14 PROCEDURE — 3075F SYST BP GE 130 - 139MM HG: CPT | Performed by: INTERNAL MEDICINE

## 2025-05-14 PROCEDURE — 1157F ADVNC CARE PLAN IN RCRD: CPT | Performed by: INTERNAL MEDICINE

## 2025-05-14 PROCEDURE — 1159F MED LIST DOCD IN RCRD: CPT | Performed by: INTERNAL MEDICINE

## 2025-05-14 PROCEDURE — 1123F ACP DISCUSS/DSCN MKR DOCD: CPT | Performed by: INTERNAL MEDICINE

## 2025-05-14 PROCEDURE — 1170F FXNL STATUS ASSESSED: CPT | Performed by: INTERNAL MEDICINE

## 2025-05-14 PROCEDURE — 3079F DIAST BP 80-89 MM HG: CPT | Performed by: INTERNAL MEDICINE

## 2025-05-14 PROCEDURE — 1158F ADVNC CARE PLAN TLK DOCD: CPT | Performed by: INTERNAL MEDICINE

## 2025-05-14 PROCEDURE — 1160F RVW MEDS BY RX/DR IN RCRD: CPT | Performed by: INTERNAL MEDICINE

## 2025-05-14 PROCEDURE — 99397 PER PM REEVAL EST PAT 65+ YR: CPT | Performed by: INTERNAL MEDICINE

## 2025-05-14 PROCEDURE — G0439 PPPS, SUBSEQ VISIT: HCPCS | Performed by: INTERNAL MEDICINE

## 2025-05-14 PROCEDURE — 1036F TOBACCO NON-USER: CPT | Performed by: INTERNAL MEDICINE

## 2025-05-14 RX ORDER — TRIAMCINOLONE ACETONIDE 40 MG/ML
2.5 INJECTION, SUSPENSION INTRA-ARTICULAR; INTRAMUSCULAR
Status: COMPLETED | OUTPATIENT
Start: 2025-05-14 | End: 2025-05-14

## 2025-05-14 RX ORDER — LIDOCAINE HYDROCHLORIDE 10 MG/ML
0.5 INJECTION, SOLUTION INFILTRATION; PERINEURAL
Status: COMPLETED | OUTPATIENT
Start: 2025-05-14 | End: 2025-05-14

## 2025-05-14 RX ADMIN — TRIAMCINOLONE ACETONIDE 2.5 MG: 40 INJECTION, SUSPENSION INTRA-ARTICULAR; INTRAMUSCULAR at 16:56

## 2025-05-14 RX ADMIN — LIDOCAINE HYDROCHLORIDE 0.5 ML: 10 INJECTION, SOLUTION INFILTRATION; PERINEURAL at 16:56

## 2025-05-14 ASSESSMENT — ACTIVITIES OF DAILY LIVING (ADL)
DRESSING: INDEPENDENT
TAKING_MEDICATION: INDEPENDENT
DOING_HOUSEWORK: INDEPENDENT
BATHING: INDEPENDENT
GROCERY_SHOPPING: INDEPENDENT
MANAGING_FINANCES: INDEPENDENT

## 2025-05-14 ASSESSMENT — PATIENT HEALTH QUESTIONNAIRE - PHQ9
1. LITTLE INTEREST OR PLEASURE IN DOING THINGS: NOT AT ALL
2. FEELING DOWN, DEPRESSED OR HOPELESS: NOT AT ALL
SUM OF ALL RESPONSES TO PHQ9 QUESTIONS 1 AND 2: 0

## 2025-05-14 ASSESSMENT — ENCOUNTER SYMPTOMS
ARTHRALGIAS: 1
WHEEZING: 0
COUGH: 0
DIARRHEA: 0
SHORTNESS OF BREATH: 0
NAUSEA: 0
CONSTIPATION: 0
ABDOMINAL PAIN: 0
PALPITATIONS: 0

## 2025-05-14 NOTE — PROGRESS NOTES
"Subjective   Reason for Visit: Mateo Berumen is an 79 y.o. male here for a Medicare Wellness visit.     Overall doing well.  Patient is fairly active.  Denies any issues with CP,SOB or dizzy spells.  No issues with anxiety, depression or sleep related problems. Denies any issues with HA, numbness or tingling.  No issues or changes with bowel or bladder habits.      Reviewed all medications by prescribing practitioner or clinical pharmacist (such as prescriptions, OTCs, herbal therapies and supplements) and documented in the medical record.    Patient Care Team:  Clif Issa DO as PCP - General  Clif Issa DO as PCP - [employer] PCP  Cruzito Mcneil MD as Consulting Physician (Cardiology)  Martha Escobar MD as Consulting Physician (Cardiology)     Review of Systems  ROS is otherwise unremarkable.       Objective   Vitals:  /80 (BP Location: Left arm, Patient Position: Sitting, BP Cuff Size: Adult)   Pulse 61   Temp 36.3 °C (97.4 °F) (Tympanic)   Resp 14   Ht 1.803 m (5' 11\")   Wt 87.1 kg (192 lb)   SpO2 97%   BMI 26.78 kg/m²       Physical Exam  Vitals reviewed.   Constitutional:       Appearance: Normal appearance.   HENT:      Head: Normocephalic.   Eyes:      Pupils: Pupils are equal, round, and reactive to light.   Cardiovascular:      Rate and Rhythm: Normal rate and regular rhythm.      Heart sounds: Normal heart sounds.   Pulmonary:      Effort: Pulmonary effort is normal.   Musculoskeletal:         General: Normal range of motion.   Neurological:      General: No focal deficit present.      Mental Status: He is alert.   Psychiatric:         Mood and Affect: Mood normal.         Assessment & Plan  Routine general medical examination at health care facility         Plantar fasciitis of left foot         Abdominal aortic aneurysm (AAA) without rupture, unspecified part         Dyslipidemia    Orders:    Comprehensive Metabolic Panel; " Future    Primary hypertension         Thrombocytopenia    Orders:    CBC; Future    Benign prostatic hyperplasia with urinary frequency         Idiopathic gout, unspecified chronicity, unspecified site         Screening for hyperlipidemia    Orders:    Lipid Panel; Future    Arthralgia, unspecified joint    Orders:    Uric Acid; Future    Screening for prostate cancer    Orders:    Prostate Specific Antigen; Future    Diabetes mellitus screening    Orders:    Hemoglobin A1C; Future    Other fatigue    Orders:    Thyroid Stimulating Hormone; Future    Physical exam is unremarkable.  We reviewed and discussed all the above.  We discussed current medications as well as most recent test results.  We discussed the importance and benefits of a healthy diet that is both low in sugars and low in saturated fats.  We reviewed and discussed the benefits of regular physical exercise especially when at or above a level of 150 minutes/week.  We also discussed the importance of stress management and good sleep hygiene.  Annual Wellness Visit questions and answers were reviewed and discussed including the importance of discussing end of life wishes as well as having a living will and health care power of .     We will continue to work on lifestyle improvements and follow-up in 6  months, sooner if any issues should arise.   Patient ID: Mateo Berumen is a 79 y.o. male.    Injection tendon or ligament for (plantarfasciitis) on 5/14/2025 4:56 PM  Indications: pain  Details: 25 G needle, dorsal approach  Medications: 2.5 mg triamcinolone acetonide 40 mg/mL; 0.5 mL lidocaine 10 mg/mL (1 %)    He has failed conservative management including oral and topical NSAIDs.  Stretches, ice bottle and even a boot without relief.    He had read about injection and would like to proceed.  He is aware of potential for tears etc.    Insertion at heal was identified and cleansed with alcohol.   Sterile technique was maintained throughout  procedure.    40 mg Kenalog was mixed with 0.5 ml of 1% lidocaine.    This mixture was successfully injected at the tendon insertion site.    Patient tolerated procedure well.  No complications.  Discussed no strenuous activities for 48 hours.   Follow up prn,  Consent was given by the patient.

## 2025-05-14 NOTE — PROGRESS NOTES
"Subjective   Patient ID: Mateo Berumen is a 79 y.o. male who presents for Medicare Annual Wellness Visit Subsequent and Foot Pain.    HPI     Review of Systems   Respiratory:  Negative for cough, shortness of breath and wheezing.    Cardiovascular:  Negative for chest pain and palpitations.   Gastrointestinal:  Negative for abdominal pain, constipation, diarrhea and nausea.   Musculoskeletal:  Positive for arthralgias.        Left foot x 24 days.       Objective   /80 (BP Location: Left arm, Patient Position: Sitting, BP Cuff Size: Adult)   Pulse 61   Temp 36.3 °C (97.4 °F) (Tympanic)   Resp 14   Ht 1.803 m (5' 11\")   Wt 87.1 kg (192 lb)   SpO2 97%   BMI 26.78 kg/m²     Physical Exam    Assessment/Plan          "

## 2025-05-24 LAB
ALBUMIN SERPL-MCNC: 4.1 G/DL (ref 3.6–5.1)
ALP SERPL-CCNC: 53 U/L (ref 35–144)
ALT SERPL-CCNC: 22 U/L (ref 9–46)
ANION GAP SERPL CALCULATED.4IONS-SCNC: 7 MMOL/L (CALC) (ref 7–17)
AST SERPL-CCNC: 14 U/L (ref 10–35)
BILIRUB SERPL-MCNC: 1 MG/DL (ref 0.2–1.2)
BUN SERPL-MCNC: 31 MG/DL (ref 7–25)
CALCIUM SERPL-MCNC: 10.3 MG/DL (ref 8.6–10.3)
CHLORIDE SERPL-SCNC: 102 MMOL/L (ref 98–110)
CHOLEST SERPL-MCNC: 173 MG/DL
CHOLEST/HDLC SERPL: 2 (CALC)
CO2 SERPL-SCNC: 25 MMOL/L (ref 20–32)
CREAT SERPL-MCNC: 1.18 MG/DL (ref 0.7–1.28)
EGFRCR SERPLBLD CKD-EPI 2021: 64 ML/MIN/1.73M2
ERYTHROCYTE [DISTWIDTH] IN BLOOD BY AUTOMATED COUNT: 14.1 % (ref 11–15)
EST. AVERAGE GLUCOSE BLD GHB EST-MCNC: 103 MG/DL
EST. AVERAGE GLUCOSE BLD GHB EST-SCNC: 5.7 MMOL/L
GLUCOSE SERPL-MCNC: 87 MG/DL (ref 65–99)
HBA1C MFR BLD: 5.2 %
HCT VFR BLD AUTO: 41.6 % (ref 38.5–50)
HDLC SERPL-MCNC: 87 MG/DL
HGB BLD-MCNC: 14.4 G/DL (ref 13.2–17.1)
LDLC SERPL CALC-MCNC: 69 MG/DL (CALC)
MCH RBC QN AUTO: 35.5 PG (ref 27–33)
MCHC RBC AUTO-ENTMCNC: 34.6 G/DL (ref 32–36)
MCV RBC AUTO: 102.5 FL (ref 80–100)
NONHDLC SERPL-MCNC: 86 MG/DL (CALC)
PLATELET # BLD AUTO: 146 THOUSAND/UL (ref 140–400)
PMV BLD REES-ECKER: 10.6 FL (ref 7.5–12.5)
POTASSIUM SERPL-SCNC: 4.4 MMOL/L (ref 3.5–5.3)
PROT SERPL-MCNC: 6.8 G/DL (ref 6.1–8.1)
PSA SERPL-MCNC: 1.2 NG/ML
RBC # BLD AUTO: 4.06 MILLION/UL (ref 4.2–5.8)
SODIUM SERPL-SCNC: 134 MMOL/L (ref 135–146)
TRIGL SERPL-MCNC: 88 MG/DL
TSH SERPL-ACNC: 0.87 MIU/L (ref 0.4–4.5)
URATE SERPL-MCNC: 3.1 MG/DL (ref 4–8)
WBC # BLD AUTO: 10.7 THOUSAND/UL (ref 3.8–10.8)

## 2025-06-14 DIAGNOSIS — E78.5 HYPERLIPIDEMIA, UNSPECIFIED: ICD-10-CM

## 2025-06-16 RX ORDER — ATORVASTATIN CALCIUM 40 MG/1
40 TABLET, FILM COATED ORAL DAILY
Qty: 90 TABLET | Refills: 3 | Status: SHIPPED | OUTPATIENT
Start: 2025-06-16

## 2026-05-12 ENCOUNTER — APPOINTMENT (OUTPATIENT)
Dept: PRIMARY CARE | Facility: CLINIC | Age: 81
End: 2026-05-12
Payer: MEDICARE

## 2026-05-19 ENCOUNTER — APPOINTMENT (OUTPATIENT)
Dept: VASCULAR SURGERY | Facility: CLINIC | Age: 81
End: 2026-05-19
Payer: MEDICARE

## 2026-05-26 ENCOUNTER — APPOINTMENT (OUTPATIENT)
Dept: VASCULAR SURGERY | Facility: CLINIC | Age: 81
End: 2026-05-26
Payer: MEDICARE